# Patient Record
Sex: MALE | URBAN - METROPOLITAN AREA
[De-identification: names, ages, dates, MRNs, and addresses within clinical notes are randomized per-mention and may not be internally consistent; named-entity substitution may affect disease eponyms.]

---

## 2017-06-05 ENCOUNTER — APPOINTMENT (OUTPATIENT)
Age: 42
Setting detail: DERMATOLOGY
End: 2017-06-08

## 2017-06-05 DIAGNOSIS — D22 MELANOCYTIC NEVI: ICD-10-CM

## 2017-06-05 DIAGNOSIS — L57.8 OTHER SKIN CHANGES DUE TO CHRONIC EXPOSURE TO NONIONIZING RADIATION: ICD-10-CM

## 2017-06-05 DIAGNOSIS — Z85.828 PERSONAL HISTORY OF OTHER MALIGNANT NEOPLASM OF SKIN: ICD-10-CM

## 2017-06-05 PROBLEM — D22.61 MELANOCYTIC NEVI OF RIGHT UPPER LIMB, INCLUDING SHOULDER: Status: ACTIVE | Noted: 2017-06-05

## 2017-06-05 PROBLEM — C44.519 BASAL CELL CARCINOMA OF SKIN OF OTHER PART OF TRUNK: Status: ACTIVE | Noted: 2017-06-05

## 2017-06-05 PROBLEM — L70.0 ACNE VULGARIS: Status: ACTIVE | Noted: 2017-06-05

## 2017-06-05 PROBLEM — D22.39 MELANOCYTIC NEVI OF OTHER PARTS OF FACE: Status: ACTIVE | Noted: 2017-06-05

## 2017-06-05 PROBLEM — C44.612 BASAL CELL CARCINOMA OF SKIN OF RIGHT UPPER LIMB, INCLUDING SHOULDER: Status: ACTIVE | Noted: 2017-06-05

## 2017-06-05 PROBLEM — D22.62 MELANOCYTIC NEVI OF LEFT UPPER LIMB, INCLUDING SHOULDER: Status: ACTIVE | Noted: 2017-06-05

## 2017-06-05 PROBLEM — D22.5 MELANOCYTIC NEVI OF TRUNK: Status: ACTIVE | Noted: 2017-06-05

## 2017-06-05 PROCEDURE — OTHER MIPS QUALITY: OTHER

## 2017-06-05 PROCEDURE — OTHER OTHER: OTHER

## 2017-06-05 PROCEDURE — OTHER REASSURANCE: OTHER

## 2017-06-05 PROCEDURE — OTHER SHAVE REMOVAL AND DESTRUCTION: OTHER

## 2017-06-05 PROCEDURE — 99214 OFFICE O/P EST MOD 30 MIN: CPT | Mod: 25

## 2017-06-05 PROCEDURE — 17261 DSTRJ MAL LES T/A/L .6-1.0CM: CPT

## 2017-06-05 PROCEDURE — OTHER TREATMENT REGIMEN: OTHER

## 2017-06-05 PROCEDURE — 17261 DSTRJ MAL LES T/A/L .6-1.0CM: CPT | Mod: 59

## 2017-06-05 PROCEDURE — OTHER FOLLOW UP FOR NEXT VISIT: OTHER

## 2017-06-05 PROCEDURE — OTHER COUNSELING: OTHER

## 2017-06-05 ASSESSMENT — LOCATION DETAILED DESCRIPTION DERM
LOCATION DETAILED: LEFT PROXIMAL DORSAL FOREARM
LOCATION DETAILED: LEFT MEDIAL INFERIOR CHEST
LOCATION DETAILED: RIGHT PROXIMAL DORSAL FOREARM
LOCATION DETAILED: RIGHT INFERIOR MEDIAL UPPER BACK
LOCATION DETAILED: LEFT CENTRAL MALAR CHEEK
LOCATION DETAILED: LEFT SUPERIOR MEDIAL UPPER BACK

## 2017-06-05 ASSESSMENT — LOCATION SIMPLE DESCRIPTION DERM
LOCATION SIMPLE: LEFT UPPER BACK
LOCATION SIMPLE: CHEST
LOCATION SIMPLE: RIGHT UPPER BACK
LOCATION SIMPLE: LEFT FOREARM
LOCATION SIMPLE: LEFT CHEEK
LOCATION SIMPLE: RIGHT FOREARM

## 2017-06-05 ASSESSMENT — LOCATION ZONE DERM
LOCATION ZONE: TRUNK
LOCATION ZONE: ARM
LOCATION ZONE: FACE

## 2017-06-05 NOTE — PROCEDURE: MIPS QUALITY
Quality 47: Advance Care Plan: Advance Care Planning discussed and documented in the medical record; patient did not wish or was not able to name a surrogate decision maker or provide an advance care plan.
Quality 110: Preventive Care And Screening: Influenza Immunization: Influenza Immunization Ordered or Recommended, but not Administered due to system reason
Quality 111:Pneumonia Vaccination Status For Older Adults: Pneumococcal Vaccination not Administered or Previously Received, Reason not Otherwise Specified
Detail Level: Detailed

## 2017-06-05 NOTE — PROCEDURE: TREATMENT REGIMEN
Plan: This scar is well healed however it is large due to what appears to have been wound dehiscence at some point post operatively. However there is no evidence of any recurrence of basal cell carcinoma
Detail Level: Detailed

## 2017-06-05 NOTE — PROCEDURE: SHAVE REMOVAL AND DESTRUCTION
Render Post-Care Instructions In Note?: yes
Size After Destruction (Required For Destruction Billing): 0.7
Cautery Type: electrodesiccation
Size After Destruction (Required For Destruction Billing): 0.6
Billing Type: Third-Party Bill
Hemostasis: Electrocautery
Consent: Written consent was obtained and risks were reviewed including but not limited to scarring, infection, bleeding, scabbing, incomplete removal, nerve damage and allergy to anesthesia.
Wound Care: Polysporin ointment
Number Of Curettages: 2
Path Notes (To The Dermatopathologist): Likely basal cell carcinoma, please evaluate
Detail Level: Detailed
Notification Instructions: Patient will be notified of biopsy results. However, patient instructed to call the office if not contacted within 1 week.
Bill For Surgical Tray: no
Path Notes (To The Dermatopathologist): Possible pigmented basal cell carcinoma, please evaluate
Anesthesia Type: 1% Xylocaine with epinephrine
Bill As?: Note: Bill Malignant Destruction If Path Confirms Malignant Lesion. Only Bill As Shave Removal If Path Comes Back Benign. Do Not Bill Shave Removal On Malignant Lesions.: Malignant Destruction
Post-Care Instructions: I reviewed with the patient in detail post-care instructions. Patient is to keep the biopsy site dry overnight, and then apply bacitracin twice daily until healed. Patient may apply hydrogen peroxide soaks to remove any crusting.
Dressing: dry sterile dressing

## 2017-06-05 NOTE — PROCEDURE: OTHER
Other (Free Text): Normal upper body exam today, follow up in six months for full body skin exam
Note Text (......Xxx Chief Complaint.): This diagnosis correlates with the
Detail Level: Zone

## 2017-06-05 NOTE — PROCEDURE: REASSURANCE
Additional Note: Normal upper body exam, no suspicious lesions, no abnormal moles. Follow up annually for full body skin exam
Include Location In Plan?: Yes
Additional Note: Patient reassured that the brown lesion near his previous surgical site on the upper back is benign, there are no suspicious features
Detail Level: Zone
Detail Level: Detailed

## 2017-07-10 ENCOUNTER — APPOINTMENT (OUTPATIENT)
Age: 42
Setting detail: DERMATOLOGY
End: 2017-07-12

## 2017-07-10 DIAGNOSIS — L73.2 HIDRADENITIS SUPPURATIVA: ICD-10-CM

## 2017-07-10 DIAGNOSIS — L57.8 OTHER SKIN CHANGES DUE TO CHRONIC EXPOSURE TO NONIONIZING RADIATION: ICD-10-CM

## 2017-07-10 DIAGNOSIS — Z85.828 PERSONAL HISTORY OF OTHER MALIGNANT NEOPLASM OF SKIN: ICD-10-CM

## 2017-07-10 PROCEDURE — OTHER PRESCRIPTION: OTHER

## 2017-07-10 PROCEDURE — OTHER COUNSELING: OTHER

## 2017-07-10 PROCEDURE — OTHER TREATMENT REGIMEN: OTHER

## 2017-07-10 PROCEDURE — 99214 OFFICE O/P EST MOD 30 MIN: CPT

## 2017-07-10 RX ORDER — DOXYCYCLINE HYCLATE 100 MG/1
100 MG CAPSULE, GELATIN COATED ORAL BID
Qty: 60 | Refills: 0 | Status: ERX | COMMUNITY
Start: 2017-07-10

## 2017-07-10 ASSESSMENT — LOCATION DETAILED DESCRIPTION DERM
LOCATION DETAILED: RIGHT POSTERIOR SHOULDER
LOCATION DETAILED: LEFT LATERAL SUPERIOR CHEST
LOCATION DETAILED: RIGHT AXILLARY VAULT

## 2017-07-10 ASSESSMENT — LOCATION SIMPLE DESCRIPTION DERM
LOCATION SIMPLE: CHEST
LOCATION SIMPLE: RIGHT SHOULDER
LOCATION SIMPLE: RIGHT AXILLARY VAULT

## 2017-07-10 ASSESSMENT — LOCATION ZONE DERM
LOCATION ZONE: TRUNK
LOCATION ZONE: AXILLAE
LOCATION ZONE: ARM

## 2017-07-10 NOTE — PROCEDURE: TREATMENT REGIMEN
Plan: Both of the treatment sites appear to be healed. There is no evidence of any residual disease. No further treatment needed at this time. Continue to monitor for any recurrence, patient will call if any changes. Follow up in six months
Detail Level: Detailed
Plan: Long discussion with patient regarding the various treatment options. He is not interested in incision and drainage at this time, the area has been draining over the last 3 to 5 days and is slowly improving. We also discussed the possibility of Humira injections however he only has rare and occasional flareups. He is interested in discussing the possibility of surgical treatment option so we will set him up with an appointment with Dr. Hong if this is something he would be able to treat

## 2017-07-10 NOTE — HPI: CYST
How Severe Is Your Cyst?: severe
Is This A New Presentation, Or A Follow-Up?: Cyst
Additional History: Patient has a history of recurrent cysts in the axilla , hips, and occasionally in the gluteal cleft. He has dealt with this problem for numerous years, has a history of HS. Overall he only gets rare and occasional flareups. He has taken antibiotics in the past, he has had surgery in the past for incision and drainage but is interested in the various other treatment options that might be available.

## 2018-02-02 ENCOUNTER — APPOINTMENT (OUTPATIENT)
Age: 43
Setting detail: DERMATOLOGY
End: 2018-02-02

## 2018-02-02 DIAGNOSIS — L65.9 NONSCARRING HAIR LOSS, UNSPECIFIED: ICD-10-CM

## 2018-02-02 DIAGNOSIS — L57.8 OTHER SKIN CHANGES DUE TO CHRONIC EXPOSURE TO NONIONIZING RADIATION: ICD-10-CM

## 2018-02-02 DIAGNOSIS — L72.8 OTHER FOLLICULAR CYSTS OF THE SKIN AND SUBCUTANEOUS TISSUE: ICD-10-CM

## 2018-02-02 DIAGNOSIS — Z85.828 PERSONAL HISTORY OF OTHER MALIGNANT NEOPLASM OF SKIN: ICD-10-CM

## 2018-02-02 PROCEDURE — OTHER SUNSCREEN RECOMMENDATIONS: OTHER

## 2018-02-02 PROCEDURE — 99214 OFFICE O/P EST MOD 30 MIN: CPT | Mod: 25

## 2018-02-02 PROCEDURE — OTHER REASSURANCE: OTHER

## 2018-02-02 PROCEDURE — OTHER TREATMENT REGIMEN: OTHER

## 2018-02-02 PROCEDURE — OTHER INCISION AND DRAINAGE: OTHER

## 2018-02-02 PROCEDURE — OTHER COUNSELING: OTHER

## 2018-02-02 PROCEDURE — 10060 I&D ABSCESS SIMPLE/SINGLE: CPT

## 2018-02-02 PROCEDURE — OTHER PRESCRIPTION: OTHER

## 2018-02-02 RX ORDER — FINASTERIDE 1 MG/1
1 MG TABLET, FILM COATED ORAL DAILY
Qty: 90 | Refills: 4 | Status: ERX | COMMUNITY
Start: 2018-02-02

## 2018-02-02 ASSESSMENT — LOCATION DETAILED DESCRIPTION DERM
LOCATION DETAILED: RIGHT MID-UPPER BACK
LOCATION DETAILED: LEFT LATERAL SUPERIOR CHEST
LOCATION DETAILED: RIGHT POSTERIOR SHOULDER
LOCATION DETAILED: LEFT MEDIAL FOREHEAD
LOCATION DETAILED: LEFT INFERIOR LATERAL UPPER BACK
LOCATION DETAILED: LEFT MEDIAL INFERIOR CHEST
LOCATION DETAILED: SUPERIOR THORACIC SPINE

## 2018-02-02 ASSESSMENT — LOCATION SIMPLE DESCRIPTION DERM
LOCATION SIMPLE: CHEST
LOCATION SIMPLE: UPPER BACK
LOCATION SIMPLE: RIGHT UPPER BACK
LOCATION SIMPLE: LEFT UPPER BACK
LOCATION SIMPLE: RIGHT SHOULDER
LOCATION SIMPLE: LEFT FOREHEAD

## 2018-02-02 ASSESSMENT — LOCATION ZONE DERM
LOCATION ZONE: ARM
LOCATION ZONE: TRUNK
LOCATION ZONE: FACE

## 2018-02-02 NOTE — PROCEDURE: TREATMENT REGIMEN
Plan: There is a large scar on the mid upper back from previous MOHs surgery but there is no evidence of any recurrence. Apparently patient had a bad infection after surgery which led to wound dehiscence and a very large scar
Plan: Both of the treatment sites appear to be healed. There is no evidence of any residual disease. No further treatment needed at this time. Continue to monitor for any recurrence, patient will call if any changes. Follow up in six months
Detail Level: Simple
Detail Level: Detailed
Plan: Patient requested refill on finasteride. No side effects on the medication. Refilled for up to one year
Plan: Initially I was unsure if this represented a sebaceous cyst but after incision and drainage a large amount of keratinaceous product was expressed. I was also able to express the entire capsule of the cyst. It should heal without any recurrence. Patient will call or follow up if any problems

## 2018-02-02 NOTE — PROCEDURE: REASSURANCE
Detail Level: Zone
Include Location In Plan?: Yes
Additional Note: No evidence of any pre-cancers or skin cancers. Follow up every six months

## 2018-02-02 NOTE — PROCEDURE: INCISION AND DRAINAGE
Include Sutures?: No
Epidermal Sutures: 4-0 Ethilon
Post-Care Instructions: I reviewed with the patient in detail post-care instructions. Patient should keep wound covered and call the office should any redness, pain, swelling or worsening occur.
Lesion Type: Cyst
Detail Level: Detailed
Render Postcare In Note?: Yes
Drainage Amount?: significant
Suture Text: The incision was partially closed with
Epidermal Closure: simple interrupted
Size Of Lesion In Cm (Optional But May Be Required For Some Insurances): 1
Drainage Type?: keratinaceous
Preparation Text: The area was prepped in the usual clean fashion.
Dressing: dry sterile dressing
Curette Text (Optional): After the contents were expressed a curette was used to partially remove the cyst wall.
Anesthesia Type: 1% Xylocaine with epinephrine
Method: 11 blade
Consent was obtained and risks were reviewed including but not limited to delayed wound healing, infection, need for multiple I and D's, and pain.
Wound Care: Polysporin ointment
Anesthesia Volume In Cc: 0.5

## 2022-02-05 ENCOUNTER — APPOINTMENT (OUTPATIENT)
Dept: CT IMAGING | Age: 47
DRG: 897 | End: 2022-02-05
Payer: MEDICAID

## 2022-02-05 ENCOUNTER — HOSPITAL ENCOUNTER (INPATIENT)
Age: 47
LOS: 2 days | Discharge: HOME | DRG: 897 | End: 2022-02-07
Attending: STUDENT IN AN ORGANIZED HEALTH CARE EDUCATION/TRAINING PROGRAM | Admitting: STUDENT IN AN ORGANIZED HEALTH CARE EDUCATION/TRAINING PROGRAM
Payer: MEDICAID

## 2022-02-05 DIAGNOSIS — F10.939 ALCOHOL WITHDRAWAL SYNDROME WITH COMPLICATION (HCC): ICD-10-CM

## 2022-02-05 DIAGNOSIS — R56.9 SEIZURE (HCC): Primary | ICD-10-CM

## 2022-02-05 PROBLEM — F41.1 GAD (GENERALIZED ANXIETY DISORDER): Status: ACTIVE | Noted: 2022-02-05

## 2022-02-05 PROBLEM — F10.20 ALCOHOLISM (HCC): Status: ACTIVE | Noted: 2022-02-05

## 2022-02-05 LAB
ALBUMIN SERPL-MCNC: 4.4 G/DL (ref 3.5–5.2)
ALP BLD-CCNC: 118 U/L (ref 40–130)
ALT SERPL-CCNC: 55 U/L (ref 5–41)
AMPHETAMINE SCREEN, URINE: NEGATIVE
ANION GAP SERPL CALCULATED.3IONS-SCNC: 21 MMOL/L (ref 7–19)
AST SERPL-CCNC: 70 U/L (ref 5–40)
BACTERIA: NEGATIVE /HPF
BARBITURATE SCREEN URINE: NEGATIVE
BASOPHILS ABSOLUTE: 0.1 K/UL (ref 0–0.2)
BASOPHILS RELATIVE PERCENT: 0.7 % (ref 0–1)
BENZODIAZEPINE SCREEN, URINE: NEGATIVE
BILIRUB SERPL-MCNC: 0.5 MG/DL (ref 0.2–1.2)
BILIRUBIN URINE: NEGATIVE
BLOOD, URINE: NEGATIVE
BUN BLDV-MCNC: 9 MG/DL (ref 6–20)
CALCIUM SERPL-MCNC: 9.7 MG/DL (ref 8.6–10)
CANNABINOID SCREEN URINE: NEGATIVE
CHLORIDE BLD-SCNC: 93 MMOL/L (ref 98–111)
CLARITY: CLEAR
CO2: 19 MMOL/L (ref 22–29)
COCAINE METABOLITE SCREEN URINE: NEGATIVE
COLOR: YELLOW
CREAT SERPL-MCNC: 1 MG/DL (ref 0.5–1.2)
CRYSTALS, UA: ABNORMAL /HPF
EOSINOPHILS ABSOLUTE: 0.1 K/UL (ref 0–0.6)
EOSINOPHILS RELATIVE PERCENT: 1.1 % (ref 0–5)
EPITHELIAL CELLS, UA: 0 /HPF (ref 0–5)
ETHANOL: <10 MG/DL (ref 0–0.08)
GFR AFRICAN AMERICAN: >59
GFR NON-AFRICAN AMERICAN: >60
GLUCOSE BLD-MCNC: 108 MG/DL (ref 74–109)
GLUCOSE URINE: NEGATIVE MG/DL
HCT VFR BLD CALC: 43.7 % (ref 42–52)
HEMOGLOBIN: 14.5 G/DL (ref 14–18)
HYALINE CASTS: 7 /HPF (ref 0–8)
IMMATURE GRANULOCYTES #: 0.1 K/UL
KETONES, URINE: 15 MG/DL
LEUKOCYTE ESTERASE, URINE: NEGATIVE
LIPASE: 27 U/L (ref 13–60)
LYMPHOCYTES ABSOLUTE: 0.7 K/UL (ref 1.1–4.5)
LYMPHOCYTES RELATIVE PERCENT: 9.7 % (ref 20–40)
Lab: NORMAL
MCH RBC QN AUTO: 33.1 PG (ref 27–31)
MCHC RBC AUTO-ENTMCNC: 33.2 G/DL (ref 33–37)
MCV RBC AUTO: 99.8 FL (ref 80–94)
MONOCYTES ABSOLUTE: 0.8 K/UL (ref 0–0.9)
MONOCYTES RELATIVE PERCENT: 10.2 % (ref 0–10)
NEUTROPHILS ABSOLUTE: 5.9 K/UL (ref 1.5–7.5)
NEUTROPHILS RELATIVE PERCENT: 77.4 % (ref 50–65)
NITRITE, URINE: NEGATIVE
OPIATE SCREEN URINE: NEGATIVE
PDW BLD-RTO: 15 % (ref 11.5–14.5)
PH UA: 6 (ref 5–8)
PLATELET # BLD: 156 K/UL (ref 130–400)
PMV BLD AUTO: 10.1 FL (ref 9.4–12.4)
POTASSIUM REFLEX MAGNESIUM: 4.2 MMOL/L (ref 3.5–5)
PROTEIN UA: 30 MG/DL
RBC # BLD: 4.38 M/UL (ref 4.7–6.1)
RBC UA: 1 /HPF (ref 0–4)
SARS-COV-2, NAAT: NOT DETECTED
SODIUM BLD-SCNC: 133 MMOL/L (ref 136–145)
SPECIFIC GRAVITY UA: 1.01 (ref 1–1.03)
TOTAL PROTEIN: 7.5 G/DL (ref 6.6–8.7)
UROBILINOGEN, URINE: 1 E.U./DL
WBC # BLD: 7.6 K/UL (ref 4.8–10.8)
WBC UA: 1 /HPF (ref 0–5)

## 2022-02-05 PROCEDURE — 99284 EMERGENCY DEPT VISIT MOD MDM: CPT

## 2022-02-05 PROCEDURE — 87635 SARS-COV-2 COVID-19 AMP PRB: CPT

## 2022-02-05 PROCEDURE — 6370000000 HC RX 637 (ALT 250 FOR IP): Performed by: NURSE PRACTITIONER

## 2022-02-05 PROCEDURE — 93005 ELECTROCARDIOGRAM TRACING: CPT | Performed by: NURSE PRACTITIONER

## 2022-02-05 PROCEDURE — 82077 ASSAY SPEC XCP UR&BREATH IA: CPT

## 2022-02-05 PROCEDURE — 70450 CT HEAD/BRAIN W/O DYE: CPT

## 2022-02-05 PROCEDURE — 36415 COLL VENOUS BLD VENIPUNCTURE: CPT

## 2022-02-05 PROCEDURE — 96375 TX/PRO/DX INJ NEW DRUG ADDON: CPT

## 2022-02-05 PROCEDURE — 83690 ASSAY OF LIPASE: CPT

## 2022-02-05 PROCEDURE — 6360000002 HC RX W HCPCS: Performed by: NURSE PRACTITIONER

## 2022-02-05 PROCEDURE — 80307 DRUG TEST PRSMV CHEM ANLYZR: CPT

## 2022-02-05 PROCEDURE — HZ2ZZZZ DETOXIFICATION SERVICES FOR SUBSTANCE ABUSE TREATMENT: ICD-10-PCS | Performed by: STUDENT IN AN ORGANIZED HEALTH CARE EDUCATION/TRAINING PROGRAM

## 2022-02-05 PROCEDURE — 1210000000 HC MED SURG R&B

## 2022-02-05 PROCEDURE — 81001 URINALYSIS AUTO W/SCOPE: CPT

## 2022-02-05 PROCEDURE — 80053 COMPREHEN METABOLIC PANEL: CPT

## 2022-02-05 PROCEDURE — 2580000003 HC RX 258: Performed by: NURSE PRACTITIONER

## 2022-02-05 PROCEDURE — 85025 COMPLETE CBC W/AUTO DIFF WBC: CPT

## 2022-02-05 PROCEDURE — 96374 THER/PROPH/DIAG INJ IV PUSH: CPT

## 2022-02-05 RX ORDER — THIAMINE HYDROCHLORIDE 100 MG/ML
100 INJECTION, SOLUTION INTRAMUSCULAR; INTRAVENOUS ONCE
Status: COMPLETED | OUTPATIENT
Start: 2022-02-05 | End: 2022-02-05

## 2022-02-05 RX ORDER — MULTIVITAMIN WITH IRON
1 TABLET ORAL ONCE
Status: COMPLETED | OUTPATIENT
Start: 2022-02-05 | End: 2022-02-05

## 2022-02-05 RX ORDER — POTASSIUM CHLORIDE 7.45 MG/ML
10 INJECTION INTRAVENOUS PRN
Status: DISCONTINUED | OUTPATIENT
Start: 2022-02-05 | End: 2022-02-07 | Stop reason: HOSPADM

## 2022-02-05 RX ORDER — ACETAMINOPHEN 325 MG/1
650 TABLET ORAL EVERY 6 HOURS PRN
Status: DISCONTINUED | OUTPATIENT
Start: 2022-02-05 | End: 2022-02-07 | Stop reason: HOSPADM

## 2022-02-05 RX ORDER — LORAZEPAM 1 MG/1
3 TABLET ORAL
Status: DISCONTINUED | OUTPATIENT
Start: 2022-02-05 | End: 2022-02-07 | Stop reason: HOSPADM

## 2022-02-05 RX ORDER — POTASSIUM CHLORIDE 20 MEQ/1
40 TABLET, EXTENDED RELEASE ORAL PRN
Status: DISCONTINUED | OUTPATIENT
Start: 2022-02-05 | End: 2022-02-07 | Stop reason: HOSPADM

## 2022-02-05 RX ORDER — LORAZEPAM 1 MG/1
1 TABLET ORAL
Status: DISCONTINUED | OUTPATIENT
Start: 2022-02-05 | End: 2022-02-07 | Stop reason: HOSPADM

## 2022-02-05 RX ORDER — LORAZEPAM 2 MG/ML
4 INJECTION INTRAMUSCULAR
Status: DISCONTINUED | OUTPATIENT
Start: 2022-02-05 | End: 2022-02-07 | Stop reason: HOSPADM

## 2022-02-05 RX ORDER — LORAZEPAM 2 MG/ML
1 INJECTION INTRAMUSCULAR ONCE
Status: COMPLETED | OUTPATIENT
Start: 2022-02-05 | End: 2022-02-05

## 2022-02-05 RX ORDER — MAGNESIUM SULFATE IN WATER 40 MG/ML
2000 INJECTION, SOLUTION INTRAVENOUS PRN
Status: DISCONTINUED | OUTPATIENT
Start: 2022-02-05 | End: 2022-02-07 | Stop reason: HOSPADM

## 2022-02-05 RX ORDER — SODIUM CHLORIDE 0.9 % (FLUSH) 0.9 %
5-40 SYRINGE (ML) INJECTION PRN
Status: DISCONTINUED | OUTPATIENT
Start: 2022-02-05 | End: 2022-02-07 | Stop reason: HOSPADM

## 2022-02-05 RX ORDER — LORAZEPAM 1 MG/1
4 TABLET ORAL
Status: DISCONTINUED | OUTPATIENT
Start: 2022-02-05 | End: 2022-02-07 | Stop reason: HOSPADM

## 2022-02-05 RX ORDER — PAROXETINE 10 MG/1
10 TABLET, FILM COATED ORAL DAILY
Status: DISCONTINUED | OUTPATIENT
Start: 2022-02-06 | End: 2022-02-07 | Stop reason: HOSPADM

## 2022-02-05 RX ORDER — THIAMINE HYDROCHLORIDE 100 MG/ML
100 INJECTION, SOLUTION INTRAMUSCULAR; INTRAVENOUS DAILY
Status: DISCONTINUED | OUTPATIENT
Start: 2022-02-06 | End: 2022-02-07 | Stop reason: HOSPADM

## 2022-02-05 RX ORDER — POLYETHYLENE GLYCOL 3350 17 G/17G
17 POWDER, FOR SOLUTION ORAL DAILY PRN
Status: DISCONTINUED | OUTPATIENT
Start: 2022-02-05 | End: 2022-02-07 | Stop reason: HOSPADM

## 2022-02-05 RX ORDER — LORAZEPAM 2 MG/ML
1 INJECTION INTRAMUSCULAR
Status: DISCONTINUED | OUTPATIENT
Start: 2022-02-05 | End: 2022-02-07 | Stop reason: HOSPADM

## 2022-02-05 RX ORDER — ONDANSETRON 4 MG/1
4 TABLET, ORALLY DISINTEGRATING ORAL EVERY 8 HOURS PRN
Status: DISCONTINUED | OUTPATIENT
Start: 2022-02-05 | End: 2022-02-07 | Stop reason: HOSPADM

## 2022-02-05 RX ORDER — POTASSIUM CHLORIDE 1.5 G/1.77G
40 POWDER, FOR SOLUTION ORAL PRN
Status: DISCONTINUED | OUTPATIENT
Start: 2022-02-05 | End: 2022-02-07 | Stop reason: HOSPADM

## 2022-02-05 RX ORDER — LORAZEPAM 1 MG/1
2 TABLET ORAL
Status: DISCONTINUED | OUTPATIENT
Start: 2022-02-05 | End: 2022-02-07 | Stop reason: HOSPADM

## 2022-02-05 RX ORDER — LORAZEPAM 2 MG/ML
2 INJECTION INTRAMUSCULAR
Status: DISCONTINUED | OUTPATIENT
Start: 2022-02-05 | End: 2022-02-07 | Stop reason: HOSPADM

## 2022-02-05 RX ORDER — PAROXETINE HYDROCHLORIDE 12.5 MG/1
12.5 TABLET, FILM COATED, EXTENDED RELEASE ORAL EVERY MORNING
COMMUNITY
End: 2022-10-27

## 2022-02-05 RX ORDER — SODIUM CHLORIDE 0.9 % (FLUSH) 0.9 %
5-40 SYRINGE (ML) INJECTION EVERY 12 HOURS SCHEDULED
Status: DISCONTINUED | OUTPATIENT
Start: 2022-02-05 | End: 2022-02-07 | Stop reason: HOSPADM

## 2022-02-05 RX ORDER — OXYMETAZOLINE HYDROCHLORIDE 0.05 G/100ML
2 SPRAY NASAL 2 TIMES DAILY
COMMUNITY
End: 2022-10-27

## 2022-02-05 RX ORDER — FOLIC ACID 1 MG/1
1 TABLET ORAL DAILY
Status: DISCONTINUED | OUTPATIENT
Start: 2022-02-06 | End: 2022-02-07 | Stop reason: HOSPADM

## 2022-02-05 RX ORDER — LORAZEPAM 2 MG/ML
3 INJECTION INTRAMUSCULAR
Status: DISCONTINUED | OUTPATIENT
Start: 2022-02-05 | End: 2022-02-07 | Stop reason: HOSPADM

## 2022-02-05 RX ORDER — MULTIVITAMIN WITH IRON
1 TABLET ORAL DAILY
Status: DISCONTINUED | OUTPATIENT
Start: 2022-02-06 | End: 2022-02-07 | Stop reason: HOSPADM

## 2022-02-05 RX ORDER — M-VIT,TX,IRON,MINS/CALC/FOLIC 27MG-0.4MG
1 TABLET ORAL DAILY
COMMUNITY
End: 2022-10-27

## 2022-02-05 RX ORDER — ACETAMINOPHEN 650 MG/1
650 SUPPOSITORY RECTAL EVERY 6 HOURS PRN
Status: DISCONTINUED | OUTPATIENT
Start: 2022-02-05 | End: 2022-02-07 | Stop reason: HOSPADM

## 2022-02-05 RX ORDER — FOLIC ACID 1 MG/1
1 TABLET ORAL ONCE
Status: COMPLETED | OUTPATIENT
Start: 2022-02-05 | End: 2022-02-05

## 2022-02-05 RX ORDER — SODIUM CHLORIDE 9 MG/ML
25 INJECTION, SOLUTION INTRAVENOUS PRN
Status: DISCONTINUED | OUTPATIENT
Start: 2022-02-05 | End: 2022-02-07 | Stop reason: HOSPADM

## 2022-02-05 RX ORDER — SODIUM CHLORIDE, SODIUM LACTATE, POTASSIUM CHLORIDE, CALCIUM CHLORIDE 600; 310; 30; 20 MG/100ML; MG/100ML; MG/100ML; MG/100ML
INJECTION, SOLUTION INTRAVENOUS CONTINUOUS
Status: ACTIVE | OUTPATIENT
Start: 2022-02-05 | End: 2022-02-06

## 2022-02-05 RX ORDER — ONDANSETRON 2 MG/ML
4 INJECTION INTRAMUSCULAR; INTRAVENOUS EVERY 6 HOURS PRN
Status: DISCONTINUED | OUTPATIENT
Start: 2022-02-05 | End: 2022-02-07 | Stop reason: HOSPADM

## 2022-02-05 RX ADMIN — SODIUM CHLORIDE, POTASSIUM CHLORIDE, SODIUM LACTATE AND CALCIUM CHLORIDE: 600; 310; 30; 20 INJECTION, SOLUTION INTRAVENOUS at 18:19

## 2022-02-05 RX ADMIN — ENOXAPARIN SODIUM 40 MG: 100 INJECTION SUBCUTANEOUS at 18:18

## 2022-02-05 RX ADMIN — LORAZEPAM 4 MG: 2 INJECTION INTRAMUSCULAR; INTRAVENOUS at 22:00

## 2022-02-05 RX ADMIN — LORAZEPAM 1 MG: 2 INJECTION INTRAMUSCULAR; INTRAVENOUS at 15:25

## 2022-02-05 RX ADMIN — THERA TABS 1 TABLET: TAB at 15:42

## 2022-02-05 RX ADMIN — LORAZEPAM 2 MG: 2 INJECTION INTRAMUSCULAR; INTRAVENOUS at 18:11

## 2022-02-05 RX ADMIN — LORAZEPAM 2 MG: 2 INJECTION INTRAMUSCULAR; INTRAVENOUS at 20:40

## 2022-02-05 RX ADMIN — THIAMINE HYDROCHLORIDE 100 MG: 100 INJECTION, SOLUTION INTRAMUSCULAR; INTRAVENOUS at 15:42

## 2022-02-05 RX ADMIN — LORAZEPAM 4 MG: 1 TABLET ORAL at 23:17

## 2022-02-05 RX ADMIN — SODIUM CHLORIDE, PRESERVATIVE FREE 10 ML: 5 INJECTION INTRAVENOUS at 20:40

## 2022-02-05 RX ADMIN — ONDANSETRON 4 MG: 2 INJECTION INTRAMUSCULAR; INTRAVENOUS at 20:40

## 2022-02-05 RX ADMIN — FOLIC ACID 1 MG: 1 TABLET ORAL at 15:42

## 2022-02-05 ASSESSMENT — PAIN SCALES - GENERAL: PAINLEVEL_OUTOF10: 1

## 2022-02-05 ASSESSMENT — ENCOUNTER SYMPTOMS
NAUSEA: 0
COUGH: 1
VOMITING: 0
SHORTNESS OF BREATH: 0
ABDOMINAL PAIN: 0
BACK PAIN: 0
DIARRHEA: 0
COUGH: 0

## 2022-02-05 NOTE — ED PROVIDER NOTES
[facility] EMERGENCY DEPT  EMERGENCY DEPARTMENT ENCOUNTER      Pt Name: Bjorn Pathak  MRN: [id number]  [place] 1975  Date of evaluation: 2/5/2022  Provider: TIBURCIO Romero [id number]       Chief Complaint   Patient presents with    Seizures     seizure activiity today, attempting to stop drinking         HISTORY OF PRESENT ILLNESS   (Location/Symptom, Timing/Onset, Context/Setting, Quality, Duration, Modifying Factors, Severity)  Note limiting factors. Bjorn Pathak is a 51 y.o. male who presents to the emergency department with concern for alcohol withdrawal and seizure today. He tells me he has had one alcohol withdrawal related seizure previously, several years ago. This week he told his girlfriend he would quit drinking, decided he needed to do this in a stepwise fashion so he didn't have any seizures, so on Wednesday he quit drinking his daily pint of vodka but continued to drink beer as normal. Last etoh was last night around midnight. He tells me today he was feeling jittery, shaky and \"not right\". He laid himself down on the ground and his girlfriend witnessed a seizure. She describes full body shaking, she rolled him on his side. He peed on himself during the episode and describes feeling groggy afterward. Now, he feels back to himself, but still feeling jittery, anxious and worried about having seizures. HPI    Nursing Notes were reviewed. REVIEW OF SYSTEMS    (2-9 systems for level 4, 10 or more for level 5)     Review of Systems   Constitutional: Negative for chills and fever. HENT: Negative for congestion. Respiratory: Negative for cough and shortness of breath. Cardiovascular: Negative for chest pain and palpitations. Gastrointestinal: Negative for abdominal pain, diarrhea, nausea and vomiting. Genitourinary: Negative for dysuria, flank pain, frequency and urgency. Musculoskeletal: Negative for back pain, myalgias and neck pain.    Neurological: Positive for tremors and seizures. Negative for dizziness, syncope, facial asymmetry, speech difficulty, weakness, light-headedness and numbness. Except as noted above the remainder of the review of systems was reviewed and negative. PAST MEDICAL HISTORY     Past Medical History:   Diagnosis Date    Seizures ([facility] [address]          SURGICAL HISTORY       Past Surgical History:   Procedure Laterality Date    EYE SURGERY      ORTHOPEDIC SURGERY           CURRENT MEDICATIONS       Previous Medications    PAROXETINE (PAXIL CR) 12.5 MG EXTENDED RELEASE TABLET    Take 12.5 mg by mouth every morning       ALLERGIES     Bactrim [sulfamethoxazole-trimethoprim]    FAMILY HISTORY     No family history on file. SOCIAL HISTORY       Social History     Socioeconomic History    Marital status: Single     Spouse name: Not on file    Number of children: Not on file    Years of education: Not on file    Highest education level: Not on file   Occupational History    Not on file   Tobacco Use    Smoking status: Current Every Day Smoker    Smokeless tobacco: Never Used   Vaping Use    Vaping Use: Never used   Substance and Sexual Activity    Alcohol use: Yes    Drug use: Never    Sexual activity: Not on file   Other Topics Concern    Not on file   Social History Narrative    Not on file     Social Determinants of Health     Financial Resource Strain:     Difficulty of Paying Living Expenses: Not on file   Food Insecurity:     Worried About Running Out of Food in the Last Year: Not on file    Chandu of Food in the Last Year: Not on file   Transportation Needs:     Lack of Transportation (Medical): Not on file    Lack of Transportation (Non-Medical):  Not on file   Physical Activity:     Days of Exercise per Week: Not on file    Minutes of Exercise per Session: Not on file   Stress:     Feeling of Stress : Not on file   Social Connections:     Frequency of Communication with Friends and Family: Not on file    Frequency of Social Gatherings with Friends and Family: Not on file    Attends Bahai Services: Not on file    Active Member of Clubs or Organizations: Not on file    Attends Club or Organization Meetings: Not on file    Marital Status: Not on file   Intimate Partner Violence:     Fear of Current or Ex-Partner: Not on file    Emotionally Abused: Not on file    Physically Abused: Not on file    Sexually Abused: Not on file   Housing Stability:     Unable to Pay for Housing in the Last Year: Not on file    Number of [place] in the Last Year: Not on file    Unstable Housing in the Last Year: Not on file       SCREENINGS         Linda Coma Scale  Eye Opening: Spontaneous  Best Verbal Response: Oriented  Best Motor Response: Obeys commands  Linda Coma Scale Score: 15                     CIWA Assessment  BP: 123/71  Pulse: 112  Nausea and Vomiting: no nausea and no vomiting  Tactile Disturbances: none  Tremor: 5  Auditory Disturbances: not present  Paroxysmal Sweats: beads of sweat obvious on forehead  Visual Disturbances: not present  Anxiety: 3  Headache, Fullness in Head: none present  Agitation: normal activity  Orientation and Clouding of Sensorium: oriented and can do serial additions  CIWA-Ar Total: 12                 PHYSICAL EXAM    (up to 7 for level 4, 8 or more for level 5)     ED Triage Vitals [02/05/22 1502]   BP Temp Temp Source Pulse Resp SpO2 Height Weight   123/71 98.4 °F (36.9 °C) Oral 112 18 94 % 6' 2\" (1.88 m) 200 lb (90.7 kg)       Physical Exam  Vitals reviewed. Constitutional:       Appearance: Normal appearance. Comments: Diaphoretic, tremors   HENT:      Head: Normocephalic and atraumatic. Right Ear: Tympanic membrane, ear canal and external ear normal.      Left Ear: Tympanic membrane and ear canal normal.      Nose: Nose normal.      Mouth/Throat:      Mouth: Mucous membranes are moist.      Pharynx: Oropharynx is clear.    Eyes:      Extraocular MCH 33.1 (*)     RDW 15.0 (*)     Neutrophils % 77.4 (*)     Lymphocytes % 9.7 (*)     Monocytes % 10.2 (*)     Lymphocytes Absolute 0.7 (*)     All other components within normal limits   COMPREHENSIVE METABOLIC PANEL W/ REFLEX TO MG FOR LOW K - Abnormal; Notable for the following components:    Sodium 133 (*)     Chloride 93 (*)     CO2 19 (*)     Anion Gap 21 (*)     ALT 55 (*)     AST 70 (*)     All other components within normal limits   URINALYSIS - Abnormal; Notable for the following components:    Ketones, Urine 15 (*)     Protein, UA 30 (*)     All other components within normal limits   MICROSCOPIC URINALYSIS - Abnormal; Notable for the following components:    Bacteria, UA NEGATIVE (*)     Crystals, UA NEG (*)     All other components within normal limits   COVID-19, RAPID   LIPASE   URINE DRUG SCREEN   ETHANOL       All other labs were within normal range or not returned as of this dictation. EMERGENCY DEPARTMENT COURSE and DIFFERENTIAL DIAGNOSIS/MDM:   Vitals:    Vitals:    02/05/22 1502   BP: 123/71   Pulse: 112   Resp: 18   Temp: 98.4 °F (36.9 °C)   TempSrc: Oral   SpO2: 94%   Weight: 200 lb (90.7 kg)   Height: 6' 2\" (1.88 m)         MDM      REASSESSMENT      Appears to have had alcohol withdrawal seizure PTA with tremors and diaphoresis in department. Case d/w Adonay/Dr Blue who accept admission. CRITICAL CARE TIME       CONSULTS:  IP CONSULT TO SOCIAL WORK    PROCEDURES:  Unless otherwise noted below, none     Procedures         FINAL IMPRESSION      1. Seizure ([address]    2. Alcohol withdrawal syndrome with complication (HCC)          DISPOSITION/PLAN   DISPOSITION        PATIENT REFERRED TO:  No follow-up provider specified. DISCHARGE MEDICATIONS:  New Prescriptions    No medications on file     Controlled Substances Monitoring:     No flowsheet data found.     (Please note that portions of this note were completed with a voice recognition program.  Efforts were made to edit the dictations but occasionally words are mis-transcribed.)    TIBURCIO Arita CNP (electronically signed)  Attending Emergency Physician         TIBURCIO Arita CNP  02/05/22 1022

## 2022-02-05 NOTE — H&P
[address] - History & Physical      PCP: Ifeanyi Pastrana    Date of Admission: 2/5/2022    Date of Service: 2/5/2022     Chief Complaint:  Alcohol withdrawal     History Of Present Illness: The patient is a 51 y.o. male who presented to [facility] ED complaining of alcohol withdrawal. Pt related that he wants to stop drinking alcohol reporting daily intake of 1pint vodka and 4cans of beer. He stopped drinking vodka 4 days ago and last drank beer this morning around 2am.     He had ran some errands in [place] with his girlfriend and on returning home developed increase tremulousness, anxiety with subsequent seizure. He tells me that he was sitting in a chair in his living room and his girlfriend helped him to the ground. He shows me an abrasion to the right side of his tongue. He reported being incontinent of urine during episode. He has history of prior seizure activity associated with alcohol withdraw in past.     In ED, patient with reported CIWA score of 12. CT of head with noted mild cerebral and cerebellar volume loss. No acute intracranial abnormality. Wbc 8, hgb 14.5, platelets 967, sodium 133, potassium 4.2, CO2 19, anion gap 21, creatinine 1.0/BUN 9, blood sugar 108, etoh less than 10, AST 79, ALT 55, alk phos 118, UDS negative, covid negative, lipase 27. Pt was admitted to hospitalist service for further evaluation and treatment of alcohol withdrawal seizure. Past Medical History:        Diagnosis Date    Seizures [facility])        Past Surgical History:        Procedure Laterality Date    EYE SURGERY      ORTHOPEDIC SURGERY         Home Medications:  Prior to Admission medications    Medication Sig Start Date End Date Taking?  Authorizing Provider   PARoxetine (PAXIL CR) 12.5 MG extended release tablet Take 12.5 mg by mouth every morning   Yes Historical Provider, MD       Allergies:    Bactrim [sulfamethoxazole-trimethoprim]    Social History:    The patient currently lives alone  Tobacco:   reports that he has been smoking. He has never used smokeless tobacco.  Alcohol:   reports current alcohol use. Illicit Drugs: none    Family History:  No family history on file. Review of Systems:   Review of Systems   Constitutional: Positive for activity change and fatigue. Negative for fever. Respiratory: Positive for cough. Gastrointestinal: Negative for abdominal pain and vomiting. Neurological: Positive for weakness (generalized). All other systems reviewed and are negative. 14 point review of systems is negative except as specifically addressed above. Physical Examination:  /71   Pulse 112   Temp 98.4 °F (36.9 °C) (Oral)   Resp 18   Ht 6' 2\" (1.88 m)   Wt 200 lb (90.7 kg)   SpO2 94%   BMI 25.68 kg/m²   Physical Exam  Vitals reviewed. Constitutional:       Comments: Somewhat anxious appearing 51 yr old male   HENT:      Head: Normocephalic. Right Ear: External ear normal.      Left Ear: External ear normal.      Mouth/Throat:      Comments: superficial abrasion right lateral tongue  Eyes:      Conjunctiva/sclera: Conjunctivae normal.      Pupils: Pupils are equal, round, and reactive to light. Cardiovascular:      Rate and Rhythm: Regular rhythm. Tachycardia present. Heart sounds: Normal heart sounds. Pulmonary:      Effort: Pulmonary effort is normal.      Breath sounds: Normal breath sounds. Abdominal:      General: Bowel sounds are normal.      Palpations: Abdomen is soft. Tenderness: There is no abdominal tenderness. Musculoskeletal:         General: Normal range of motion. Cervical back: Normal range of motion. Comments: Moves extremities equally x4   Skin:     General: Skin is warm and dry. Neurological:      Mental Status: He is alert and oriented to person, place, and time.           Diagnostic Data:  CBC:  Recent Labs     02/05/22  1500   WBC 7.6   HGB 14.5   HCT 43.7        BMP:  Recent Labs 02/05/22  1500   *   K 4.2   CL 93*   CO2 19*   BUN 9   CREATININE 1.0   CALCIUM 9.7     Recent Labs     02/05/22  1500   AST 70*   ALT 55*   BILITOT 0.5   ALKPHOS 118     Coag Panel: No results for input(s): INR, PROTIME, APTT in the last 72 hours. Cardiac Enzymes: No results for input(s): Erle Keepers in the last 72 hours. ABGs:No results found for: PHART, PO2ART, ZIV3FJG  Urinalysis:  Lab Results   Component Value Date    NITRU Negative 02/05/2022    WBCUA 1 02/05/2022    BACTERIA NEGATIVE 02/05/2022    RBCUA 1 02/05/2022    BLOODU Negative 02/05/2022    SPECGRAV 1.014 02/05/2022    GLUCOSEU Negative 02/05/2022       CT Head WO Contrast    Result Date: 2/5/2022  CT head 2/5/2022 4:18 PM HISTORY: Seizure TECHNIQUE: Axial images of the head were obtained without IV contrast. Coronal and sagittal reformatted images are reconstructed and reviewed. COMPARISON: None. DLP: 821 mGy cm Automated exposure control was utilized to minimize patient radiation dose. FINDINGS: There is some mild cerebral volume loss. There is no intracranial hemorrhage or mass effect. No acute signs of ischemia. No extra-axial hematoma or subarachnoid hemorrhage. The visible paranasal sinuses and mastoid air cells are well-aerated. The bony calvarium appears intact. 1. Mild cerebral and cerebellar volume loss. No acute intracranial abnormality. Signed by Dr Vic Avery      Assessment/Plan:  Active Problems:    Alcohol withdrawal seizure with complication ([address]    KATHLEEN (generalized anxiety disorder)  Resolved Problems:    * No resolved hospital problems. *     Active Problems:    Alcohol withdrawal seizure with complication    -seizure precautions   -LR at 100cc/hr   -daily multivitamin   -daily thiamine   -social service consult   -wa protocol   -follow electrolytes   -monitor vitals   -telemetry    KATHLEEN (generalized anxiety disorder)   -continue paxil    Resolved Problems:    * No resolved hospital problems. *  Signed:  TIBURCIO Aldrich - CNP, 2/5/2022 5:38 PM

## 2022-02-05 NOTE — ED NOTES
Pt experienced seizure activity today per patient and his girlfriend. Pt states that he usually drinks about a pint of 80 proof vodka daily but has been trying to stop and has not had any vodka since Tuesday with only a few beers since then as well. Pt states that he had a similar issue with seizures when he tried to stop drinking in 2018/2019.      Heron Claude, RN  02/05/22 0978

## 2022-02-05 NOTE — ED NOTES
Bed: 06  Expected date:   Expected time:   Means of arrival:   Comments:  Gabrielle Mcelroy RN  02/05/22 7577

## 2022-02-06 LAB
ALBUMIN SERPL-MCNC: 3.7 G/DL (ref 3.5–5.2)
ALP BLD-CCNC: 103 U/L (ref 40–130)
ALT SERPL-CCNC: 41 U/L (ref 5–41)
ANION GAP SERPL CALCULATED.3IONS-SCNC: 14 MMOL/L (ref 7–19)
AST SERPL-CCNC: 62 U/L (ref 5–40)
BILIRUB SERPL-MCNC: 0.4 MG/DL (ref 0.2–1.2)
BILIRUBIN DIRECT: 0.1 MG/DL (ref 0–0.3)
BILIRUBIN, INDIRECT: 0.3 MG/DL (ref 0.1–1)
BUN BLDV-MCNC: 12 MG/DL (ref 6–20)
CALCIUM SERPL-MCNC: 8.8 MG/DL (ref 8.6–10)
CHLORIDE BLD-SCNC: 101 MMOL/L (ref 98–111)
CO2: 22 MMOL/L (ref 22–29)
CREAT SERPL-MCNC: 0.7 MG/DL (ref 0.5–1.2)
GFR AFRICAN AMERICAN: >59
GFR NON-AFRICAN AMERICAN: >60
GLUCOSE BLD-MCNC: 81 MG/DL (ref 74–109)
HCT VFR BLD CALC: 39.3 % (ref 42–52)
HEMOGLOBIN: 12.7 G/DL (ref 14–18)
INR BLD: 0.9 (ref 0.88–1.18)
MAGNESIUM: 2.3 MG/DL (ref 1.6–2.6)
MCH RBC QN AUTO: 32.7 PG (ref 27–31)
MCHC RBC AUTO-ENTMCNC: 32.3 G/DL (ref 33–37)
MCV RBC AUTO: 101.3 FL (ref 80–94)
PDW BLD-RTO: 15.3 % (ref 11.5–14.5)
PLATELET # BLD: 147 K/UL (ref 130–400)
PMV BLD AUTO: 10.7 FL (ref 9.4–12.4)
POTASSIUM SERPL-SCNC: 3.7 MMOL/L (ref 3.5–5)
PROTHROMBIN TIME: 12.4 SEC (ref 12–14.6)
RBC # BLD: 3.88 M/UL (ref 4.7–6.1)
SODIUM BLD-SCNC: 137 MMOL/L (ref 136–145)
TOTAL PROTEIN: 6.4 G/DL (ref 6.6–8.7)
WBC # BLD: 6.4 K/UL (ref 4.8–10.8)

## 2022-02-06 PROCEDURE — 6360000002 HC RX W HCPCS: Performed by: NURSE PRACTITIONER

## 2022-02-06 PROCEDURE — 85027 COMPLETE CBC AUTOMATED: CPT

## 2022-02-06 PROCEDURE — 2580000003 HC RX 258: Performed by: STUDENT IN AN ORGANIZED HEALTH CARE EDUCATION/TRAINING PROGRAM

## 2022-02-06 PROCEDURE — 2580000003 HC RX 258: Performed by: NURSE PRACTITIONER

## 2022-02-06 PROCEDURE — 85610 PROTHROMBIN TIME: CPT

## 2022-02-06 PROCEDURE — 80076 HEPATIC FUNCTION PANEL: CPT

## 2022-02-06 PROCEDURE — 80048 BASIC METABOLIC PNL TOTAL CA: CPT

## 2022-02-06 PROCEDURE — 36415 COLL VENOUS BLD VENIPUNCTURE: CPT

## 2022-02-06 PROCEDURE — 1210000000 HC MED SURG R&B

## 2022-02-06 PROCEDURE — 6370000000 HC RX 637 (ALT 250 FOR IP): Performed by: STUDENT IN AN ORGANIZED HEALTH CARE EDUCATION/TRAINING PROGRAM

## 2022-02-06 PROCEDURE — 83735 ASSAY OF MAGNESIUM: CPT

## 2022-02-06 RX ORDER — OXYMETAZOLINE HYDROCHLORIDE 0.05 G/100ML
2 SPRAY NASAL 2 TIMES DAILY PRN
Status: DISCONTINUED | OUTPATIENT
Start: 2022-02-06 | End: 2022-02-07 | Stop reason: HOSPADM

## 2022-02-06 RX ORDER — NICOTINE 21 MG/24HR
1 PATCH, TRANSDERMAL 24 HOURS TRANSDERMAL DAILY
Status: DISCONTINUED | OUTPATIENT
Start: 2022-02-06 | End: 2022-02-07 | Stop reason: HOSPADM

## 2022-02-06 RX ADMIN — Medication 10 ML: at 09:53

## 2022-02-06 RX ADMIN — LORAZEPAM 4 MG: 2 INJECTION INTRAMUSCULAR; INTRAVENOUS at 00:24

## 2022-02-06 RX ADMIN — LORAZEPAM 2 MG: 2 INJECTION INTRAMUSCULAR; INTRAVENOUS at 14:39

## 2022-02-06 RX ADMIN — THIAMINE HYDROCHLORIDE 100 MG: 100 INJECTION, SOLUTION INTRAMUSCULAR; INTRAVENOUS at 09:58

## 2022-02-06 RX ADMIN — LORAZEPAM 4 MG: 2 INJECTION INTRAMUSCULAR; INTRAVENOUS at 01:59

## 2022-02-06 RX ADMIN — OXYMETAZOLINE HCL 2 SPRAY: 0.05 SPRAY NASAL at 21:36

## 2022-02-06 RX ADMIN — SODIUM CHLORIDE, POTASSIUM CHLORIDE, SODIUM LACTATE AND CALCIUM CHLORIDE: 600; 310; 30; 20 INJECTION, SOLUTION INTRAVENOUS at 17:58

## 2022-02-06 RX ADMIN — LORAZEPAM 2 MG: 2 INJECTION INTRAMUSCULAR; INTRAVENOUS at 17:58

## 2022-02-06 RX ADMIN — LORAZEPAM 2 MG: 2 INJECTION INTRAMUSCULAR; INTRAVENOUS at 12:47

## 2022-02-06 RX ADMIN — LORAZEPAM 2 MG: 2 INJECTION INTRAMUSCULAR; INTRAVENOUS at 21:36

## 2022-02-06 NOTE — PROGRESS NOTES
Patient sleeping soundly at present time . Sao2 98% per room air pulse 74 . Respirations non labored @18.

## 2022-02-06 NOTE — PLAN OF CARE
Problem: Falls - Risk of:  Goal: Will remain free from falls  Description: Will remain free from falls  Outcome: Ongoing  Goal: Absence of physical injury  Description: Absence of physical injury  Outcome: Ongoing     Problem: Skin Integrity:  Goal: Will show no infection signs and symptoms  Description: Will show no infection signs and symptoms  Outcome: Ongoing  Goal: Absence of new skin breakdown  Description: Absence of new skin breakdown  Outcome: Ongoing     Problem: Discharge Planning:  Goal: Discharged to appropriate level of care  Description: Discharged to appropriate level of care  Outcome: Ongoing     Problem: Fluid Volume - Deficit:  Goal: Absence of fluid volume deficit signs and symptoms  Description: Absence of fluid volume deficit signs and symptoms  Outcome: Ongoing     Problem: Nutrition Deficit:  Goal: Ability to achieve adequate nutritional intake will improve  Description: Ability to achieve adequate nutritional intake will improve  Outcome: Ongoing     Problem: Sleep Pattern Disturbance:  Goal: Appears well-rested  Description: Appears well-rested  Outcome: Ongoing     Problem: Violence - Risk of, Self/Other-Directed:  Goal: Knowledge of developmental care interventions  Description: Absence of violence  Outcome: Ongoing

## 2022-02-06 NOTE — PROGRESS NOTES
Daily Progress Note    Date:2022  Patient: Cindy Beltran  : 1975  RE  CODE:Full Code No additional code details  PCP:Jayant Stanton    Admit Date: 2022  2:58 PM   LOS: 1 day       Subjective:      Events noted. He is having significant tremulousness, withdrawal symptoms with relatively high CIWA scores. He wants to know when he can go home. Summary: 58-year-old male with alcohol abuse with prior history of alcohol withdrawal seizure, previously drinking at least a pint of vodka and multiple beers daily, then 4 days prior to presentation decided to wean off alcohol by stopping vodka entirely, then prior to presentation his girlfriend noted that he had developed tremulousness, anxiety that was followed by a tonic-clonic seizure with tongue biting and urinary incontinence. Initial work-up including CT head with no acute findings but did note mild cerebral and cerebellar volume loss. UDS was negative, alcohol level negative, transaminitis noted likely secondary to alcohol abuse. Patient admitted and monitored on CIWA protocol.       Review of Systems    Comprehensive ROS completed and is negative except as otherwise noted      Objective:      Vital signs in last 24 hours:  Patient Vitals for the past 24 hrs:   BP Temp Temp src Pulse Resp SpO2 Height Weight   22 1206 (!) 143/85 98.1 °F (36.7 °C) Temporal 84 16 94 % -- --   22 0601 (!) 152/91 -- -- 96 20 -- -- --   22 0415 -- 97.1 °F (36.2 °C) Temporal 74 18 98 % -- --   22 0200 (!) 149/89 -- -- 97 24 -- -- --   22 0143 (!) 149/89 98.2 °F (36.8 °C) Temporal 97 20 96 % -- --   22 0015 127/84 -- -- 91 -- -- -- --   22 0011 127/84 99 °F (37.2 °C) Temporal 91 18 93 % -- --   22 2300 120/82 98.1 °F (36.7 °C) Temporal 102 22 97 % -- --   22 -- -- -- 112 -- -- -- --   22 -- -- -- 110 -- -- -- --   22 -- -- -- 114 -- -- -- --   22 (!) 142/91 97.5 °F (36.4 39/58/86 1411    folic acid (FOLVITE) tablet 1 mg, 1 mg, Oral, Daily, Ursula Sanchez MD        Assessment/plan  Active Problems:    Alcohol withdrawal seizure with complication (HCC)    KATHLEEN (generalized anxiety disorder)  Resolved Problems:    * No resolved hospital problems.  *      Continue CIWA protocol with as needed Ativan  Monitor hemodynamics  IV fluid  Supplement thiamine/multivitamin/folate    Monitor and replete electrolytes as indicated    Paxil for anxiety    Social work assistance for rehab    DVT prophylaxis Lovenox      Ursula Sanchez MD 2/6/2022 1:47 PM

## 2022-02-07 VITALS
SYSTOLIC BLOOD PRESSURE: 132 MMHG | TEMPERATURE: 97.5 F | WEIGHT: 200 LBS | RESPIRATION RATE: 20 BRPM | DIASTOLIC BLOOD PRESSURE: 102 MMHG | HEIGHT: 74 IN | BODY MASS INDEX: 25.67 KG/M2 | HEART RATE: 92 BPM | OXYGEN SATURATION: 98 %

## 2022-02-07 PROBLEM — N17.9 AKI (ACUTE KIDNEY INJURY) (HCC): Status: ACTIVE | Noted: 2022-02-07

## 2022-02-07 PROBLEM — R74.01 TRANSAMINITIS: Status: ACTIVE | Noted: 2022-02-07

## 2022-02-07 LAB
ALBUMIN SERPL-MCNC: 3.7 G/DL (ref 3.5–5.2)
ALP BLD-CCNC: 102 U/L (ref 40–130)
ALT SERPL-CCNC: 41 U/L (ref 5–41)
ANION GAP SERPL CALCULATED.3IONS-SCNC: 13 MMOL/L (ref 7–19)
AST SERPL-CCNC: 54 U/L (ref 5–40)
BILIRUB SERPL-MCNC: 0.4 MG/DL (ref 0.2–1.2)
BILIRUBIN DIRECT: 0.1 MG/DL (ref 0–0.3)
BILIRUBIN, INDIRECT: 0.3 MG/DL (ref 0.1–1)
BUN BLDV-MCNC: 11 MG/DL (ref 6–20)
CALCIUM SERPL-MCNC: 8.7 MG/DL (ref 8.6–10)
CHLORIDE BLD-SCNC: 104 MMOL/L (ref 98–111)
CO2: 24 MMOL/L (ref 22–29)
CREAT SERPL-MCNC: 0.7 MG/DL (ref 0.5–1.2)
EKG P AXIS: NORMAL DEGREES
EKG P-R INTERVAL: NORMAL MS
EKG Q-T INTERVAL: 326 MS
EKG QRS DURATION: 84 MS
EKG QTC CALCULATION (BAZETT): 411 MS
EKG T AXIS: 29 DEGREES
GFR AFRICAN AMERICAN: >59
GFR NON-AFRICAN AMERICAN: >60
GLUCOSE BLD-MCNC: 98 MG/DL (ref 74–109)
HCT VFR BLD CALC: 38.5 % (ref 42–52)
HEMOGLOBIN: 13 G/DL (ref 14–18)
MAGNESIUM: 2.2 MG/DL (ref 1.6–2.6)
MCH RBC QN AUTO: 33.8 PG (ref 27–31)
MCHC RBC AUTO-ENTMCNC: 33.8 G/DL (ref 33–37)
MCV RBC AUTO: 100 FL (ref 80–94)
PDW BLD-RTO: 14.8 % (ref 11.5–14.5)
PLATELET # BLD: 138 K/UL (ref 130–400)
PMV BLD AUTO: 10.6 FL (ref 9.4–12.4)
POTASSIUM SERPL-SCNC: 3.9 MMOL/L (ref 3.5–5)
RBC # BLD: 3.85 M/UL (ref 4.7–6.1)
SODIUM BLD-SCNC: 141 MMOL/L (ref 136–145)
TOTAL PROTEIN: 5.6 G/DL (ref 6.6–8.7)
WBC # BLD: 5.3 K/UL (ref 4.8–10.8)

## 2022-02-07 PROCEDURE — 6370000000 HC RX 637 (ALT 250 FOR IP): Performed by: NURSE PRACTITIONER

## 2022-02-07 PROCEDURE — 6370000000 HC RX 637 (ALT 250 FOR IP): Performed by: STUDENT IN AN ORGANIZED HEALTH CARE EDUCATION/TRAINING PROGRAM

## 2022-02-07 PROCEDURE — 85027 COMPLETE CBC AUTOMATED: CPT

## 2022-02-07 PROCEDURE — 80076 HEPATIC FUNCTION PANEL: CPT

## 2022-02-07 PROCEDURE — 80048 BASIC METABOLIC PNL TOTAL CA: CPT

## 2022-02-07 PROCEDURE — 6360000002 HC RX W HCPCS: Performed by: NURSE PRACTITIONER

## 2022-02-07 PROCEDURE — 36415 COLL VENOUS BLD VENIPUNCTURE: CPT

## 2022-02-07 PROCEDURE — 83735 ASSAY OF MAGNESIUM: CPT

## 2022-02-07 RX ORDER — CHLORDIAZEPOXIDE HYDROCHLORIDE 25 MG/1
25 CAPSULE, GELATIN COATED ORAL 4 TIMES DAILY PRN
Qty: 12 CAPSULE | Refills: 0 | Status: SHIPPED | OUTPATIENT
Start: 2022-02-07 | End: 2022-02-10

## 2022-02-07 RX ADMIN — THIAMINE HYDROCHLORIDE 100 MG: 100 INJECTION, SOLUTION INTRAMUSCULAR; INTRAVENOUS at 08:58

## 2022-02-07 RX ADMIN — THERA TABS 1 TABLET: TAB at 08:58

## 2022-02-07 RX ADMIN — PAROXETINE 10 MG: 10 TABLET, FILM COATED ORAL at 08:58

## 2022-02-07 RX ADMIN — FOLIC ACID 1 MG: 1 TABLET ORAL at 08:58

## 2022-02-07 RX ADMIN — LORAZEPAM 2 MG: 1 TABLET ORAL at 09:03

## 2022-02-07 RX ADMIN — LORAZEPAM 3 MG: 2 INJECTION INTRAMUSCULAR; INTRAVENOUS at 03:28

## 2022-02-07 ASSESSMENT — PAIN SCALES - GENERAL
PAINLEVEL_OUTOF10: 0
PAINLEVEL_OUTOF10: 0

## 2022-02-07 NOTE — DISCHARGE SUMMARY
Discharge Summary    NAME: Charmaine Wright  :  1975  MRN:  [id number]    Admit date:  2022  Discharge date:  2022    Admitting Physician:  Wero Gomez MD    Advance Directive: Full Code    Primary Care Physician:  Jeff Whitley    Discharge Diagnoses: Active Problems:    Alcohol withdrawal seizure with complication (HCC)    KATHLEEN (generalized anxiety disorder)    NICHOLAS (acute kidney injury) ([facility] [address]    Transaminitis        Significant Diagnostic Studies:   CT Head WO Contrast    Result Date: 2022  CT head 2022 4:18 PM HISTORY: Seizure TECHNIQUE: Axial images of the head were obtained without IV contrast. Coronal and sagittal reformatted images are reconstructed and reviewed. COMPARISON: None. DLP: 821 mGy cm Automated exposure control was utilized to minimize patient radiation dose. FINDINGS: There is some mild cerebral volume loss. There is no intracranial hemorrhage or mass effect. No acute signs of ischemia. No extra-axial hematoma or subarachnoid hemorrhage. The visible paranasal sinuses and mastoid air cells are well-aerated. The bony calvarium appears intact. 1. Mild cerebral and cerebellar volume loss. No acute intracranial abnormality.  Signed by Dr Lolly Evans      Pertinent Labs:   CBC:   Recent Labs     22  1500 22  0603 22  0314   WBC 7.6 6.4 5.3   HGB 14.5 12.7* 13.0*    147 138     BMP:    Recent Labs     22  1500 22  0603 22  0314   * 137 141   K 4.2 3.7 3.9   CL 93* 101 104   CO2 19* 22 24   BUN 9 12 11   CREATININE 1.0 0.7 0.7   GLUCOSE 108 81 98     INR:   Recent Labs     22  0603   INR 0.90             Hospital Course:     69-year-old male with alcohol abuse with prior history of alcohol withdrawal seizure, previously drinking at least a pint of vodka and multiple beers daily, then 4 days prior to presentation decided to wean off alcohol by stopping vodka entirely, then prior to presentation his girlfriend noted that he had developed tremulousness, anxiety that was followed by a tonic-clonic seizure with tongue biting and urinary incontinence. Initial work-up including CT head with no acute findings but did note mild cerebral and cerebellar volume loss. UDS was negative, alcohol level negative, transaminitis noted likely secondary to alcohol abuse. LFTs down trended while inpatient. Mild NICHOLAS noted which improved with IV fluids. Patient admitted and monitored on [facility] protocol. Had significant withdrawal symptoms day following admission, however this gradually improved. Resting comfortably on 2/7 with very mild tremulousness but no other withdrawal signs or symptoms. Patient wants to go home and otherwise medically stable. Discussed alcohol rehab resources. Patient notes he has previously been through alcohol rehab program, not interested in going again, but verbalized understanding of the steps he needs to take to remain abstinent from alcohol. Will continue on Librium as needed for alcohol withdrawal symptoms in the short-term and follow-up with his PCP within 1 week. Physical Exam:  Vital Signs: BP (!) 132/102 Comment: manual  Pulse 92   Temp 97.5 °F (36.4 °C) (Temporal)   Resp 20   Ht 6' 2\" (1.88 m)   Wt 200 lb (90.7 kg)   SpO2 98%   BMI 25.68 kg/m²   General appearance:. Alert and Cooperative   HEENT: Normocephalic. Atraumatic  Chest: clear to auscultation bilaterally without wheezes or rhonchi. Cardiac: Normal heart tones with regular rate and rhythm  Abdomen:soft, non-tender; normal bowel sounds. Extremities: mild tremulousness. No clubbing or cyanosis. No peripheral edema. Peripheral pulses palpable. Neurologic: Grossly intact. Discharge Medications:         Medication List      START taking these medications    chlordiazePOXIDE 25 MG capsule  Commonly known as: LIBRIUM  Take 1 capsule by mouth 4 times daily as needed (alcohol withdrawal) for up to 3 days.         CONTINUE taking these medications    oxymetazoline 0.05 % nasal spray  Commonly known as: AFRIN     PARoxetine 12.5 MG extended release tablet  Commonly known as: PAXIL CR     therapeutic multivitamin-minerals tablet           Where to Get Your Medications      These medications were sent to [facility] [address]  [address] [facility] [address]    Phone: [phone]   · chlordiazePOXIDE 25 MG capsule         Discharge Instructions: Follow up with Donnie Petit within 1 week. Take medications as directed. Resume activity as tolerated. Disposition: Patient is medically stable and will be discharged home. Time spent on discharge 35 minutes.      Signed:  Wes Luna MD  2/7/2022 11:29 AM

## 2022-10-27 ENCOUNTER — APPOINTMENT (OUTPATIENT)
Dept: CT IMAGING | Age: 47
DRG: 897 | End: 2022-10-27
Payer: MEDICAID

## 2022-10-27 ENCOUNTER — HOSPITAL ENCOUNTER (INPATIENT)
Age: 47
LOS: 5 days | Discharge: HOME | DRG: 897 | End: 2022-11-01
Attending: EMERGENCY MEDICINE | Admitting: INTERNAL MEDICINE
Payer: MEDICAID

## 2022-10-27 ENCOUNTER — APPOINTMENT (OUTPATIENT)
Dept: GENERAL RADIOLOGY | Age: 47
DRG: 897 | End: 2022-10-27
Payer: MEDICAID

## 2022-10-27 DIAGNOSIS — F10.920 ACUTE ALCOHOLIC INTOXICATION WITHOUT COMPLICATION (HCC): ICD-10-CM

## 2022-10-27 DIAGNOSIS — F32.A DEPRESSION, UNSPECIFIED DEPRESSION TYPE: Primary | ICD-10-CM

## 2022-10-27 DIAGNOSIS — R29.6 MULTIPLE FALLS: ICD-10-CM

## 2022-10-27 PROBLEM — F10.220 ALCOHOL INTOXICATION IN ACTIVE ALCOHOLIC WITHOUT COMPLICATION (HCC): Status: ACTIVE | Noted: 2022-10-27

## 2022-10-27 LAB
ALBUMIN SERPL-MCNC: 3.2 G/DL (ref 3.5–5.2)
ALP BLD-CCNC: 172 U/L (ref 40–130)
ALT SERPL-CCNC: 40 U/L (ref 5–41)
AMPHETAMINE SCREEN, URINE: NEGATIVE
ANION GAP SERPL CALCULATED.3IONS-SCNC: 14 MMOL/L (ref 7–19)
AST SERPL-CCNC: 99 U/L (ref 5–40)
BACTERIA: NEGATIVE /HPF
BARBITURATE SCREEN URINE: NEGATIVE
BENZODIAZEPINE SCREEN, URINE: POSITIVE
BILIRUB SERPL-MCNC: 0.3 MG/DL (ref 0.2–1.2)
BILIRUBIN URINE: NEGATIVE
BLOOD, URINE: NEGATIVE
BUN BLDV-MCNC: 8 MG/DL (ref 6–20)
BUPRENORPHINE URINE: NEGATIVE
CALCIUM SERPL-MCNC: 9.1 MG/DL (ref 8.6–10)
CANNABINOID SCREEN URINE: NEGATIVE
CHLORIDE BLD-SCNC: 100 MMOL/L (ref 98–111)
CLARITY: CLEAR
CO2: 28 MMOL/L (ref 22–29)
COCAINE METABOLITE SCREEN URINE: NEGATIVE
COLOR: YELLOW
CREAT SERPL-MCNC: 0.6 MG/DL (ref 0.5–1.2)
CRYSTALS, UA: ABNORMAL /HPF
EKG P AXIS: 81 DEGREES
EKG P-R INTERVAL: 152 MS
EKG Q-T INTERVAL: 368 MS
EKG QRS DURATION: 98 MS
EKG QTC CALCULATION (BAZETT): 415 MS
EKG T AXIS: 45 DEGREES
EPITHELIAL CELLS, UA: 0 /HPF (ref 0–5)
ETHANOL: 394 MG/DL (ref 0–0.08)
ETHANOL: 56 MG/DL (ref 0–0.08)
GFR SERPL CREATININE-BSD FRML MDRD: >60 ML/MIN/{1.73_M2}
GLUCOSE BLD-MCNC: 86 MG/DL (ref 74–109)
GLUCOSE URINE: NEGATIVE MG/DL
HCT VFR BLD CALC: 37 % (ref 42–52)
HEMOGLOBIN: 12.7 G/DL (ref 14–18)
HYALINE CASTS: 1 /HPF (ref 0–8)
KETONES, URINE: NEGATIVE MG/DL
LEUKOCYTE ESTERASE, URINE: NEGATIVE
Lab: ABNORMAL
MCH RBC QN AUTO: 35 PG (ref 27–31)
MCHC RBC AUTO-ENTMCNC: 34.3 G/DL (ref 33–37)
MCV RBC AUTO: 101.9 FL (ref 80–94)
METHADONE SCREEN, URINE: NEGATIVE
METHAMPHETAMINE, URINE: NEGATIVE
NITRITE, URINE: NEGATIVE
OPIATE SCREEN URINE: NEGATIVE
OXYCODONE URINE: NEGATIVE
PDW BLD-RTO: 12.9 % (ref 11.5–14.5)
PH UA: 6.5 (ref 5–8)
PHENCYCLIDINE SCREEN URINE: NEGATIVE
PLATELET # BLD: 112 K/UL (ref 130–400)
PMV BLD AUTO: 9.9 FL (ref 9.4–12.4)
POTASSIUM SERPL-SCNC: 3.4 MMOL/L (ref 3.5–5)
PROPOXYPHENE SCREEN: NEGATIVE
PROTEIN UA: 30 MG/DL
RBC # BLD: 3.63 M/UL (ref 4.7–6.1)
RBC UA: 2 /HPF (ref 0–4)
SARS-COV-2, NAAT: NOT DETECTED
SODIUM BLD-SCNC: 142 MMOL/L (ref 136–145)
SPECIFIC GRAVITY UA: 1.01 (ref 1–1.03)
TOTAL PROTEIN: 6.5 G/DL (ref 6.6–8.7)
TRICYCLIC, URINE: NEGATIVE
TROPONIN: <0.01 NG/ML (ref 0–0.03)
UROBILINOGEN, URINE: 1 E.U./DL
WBC # BLD: 4.5 K/UL (ref 4.8–10.8)
WBC UA: 0 /HPF (ref 0–5)

## 2022-10-27 PROCEDURE — 93005 ELECTROCARDIOGRAM TRACING: CPT | Performed by: EMERGENCY MEDICINE

## 2022-10-27 PROCEDURE — 72170 X-RAY EXAM OF PELVIS: CPT | Performed by: RADIOLOGY

## 2022-10-27 PROCEDURE — 2580000003 HC RX 258: Performed by: NURSE PRACTITIONER

## 2022-10-27 PROCEDURE — 6370000000 HC RX 637 (ALT 250 FOR IP): Performed by: EMERGENCY MEDICINE

## 2022-10-27 PROCEDURE — 80053 COMPREHEN METABOLIC PANEL: CPT

## 2022-10-27 PROCEDURE — 1210000000 HC MED SURG R&B

## 2022-10-27 PROCEDURE — 81001 URINALYSIS AUTO W/SCOPE: CPT

## 2022-10-27 PROCEDURE — 2580000003 HC RX 258: Performed by: EMERGENCY MEDICINE

## 2022-10-27 PROCEDURE — 99285 EMERGENCY DEPT VISIT HI MDM: CPT

## 2022-10-27 PROCEDURE — 72170 X-RAY EXAM OF PELVIS: CPT

## 2022-10-27 PROCEDURE — 82077 ASSAY SPEC XCP UR&BREATH IA: CPT

## 2022-10-27 PROCEDURE — 80306 DRUG TEST PRSMV INSTRMNT: CPT

## 2022-10-27 PROCEDURE — 36415 COLL VENOUS BLD VENIPUNCTURE: CPT

## 2022-10-27 PROCEDURE — 70450 CT HEAD/BRAIN W/O DYE: CPT

## 2022-10-27 PROCEDURE — 84484 ASSAY OF TROPONIN QUANT: CPT

## 2022-10-27 PROCEDURE — 6370000000 HC RX 637 (ALT 250 FOR IP): Performed by: NURSE PRACTITIONER

## 2022-10-27 PROCEDURE — 72125 CT NECK SPINE W/O DYE: CPT | Performed by: RADIOLOGY

## 2022-10-27 PROCEDURE — 87635 SARS-COV-2 COVID-19 AMP PRB: CPT

## 2022-10-27 PROCEDURE — 6360000002 HC RX W HCPCS: Performed by: NURSE PRACTITIONER

## 2022-10-27 PROCEDURE — 71045 X-RAY EXAM CHEST 1 VIEW: CPT

## 2022-10-27 PROCEDURE — 72125 CT NECK SPINE W/O DYE: CPT

## 2022-10-27 PROCEDURE — 70450 CT HEAD/BRAIN W/O DYE: CPT | Performed by: RADIOLOGY

## 2022-10-27 PROCEDURE — 85027 COMPLETE CBC AUTOMATED: CPT

## 2022-10-27 RX ORDER — GAUZE BANDAGE 2" X 2"
100 BANDAGE TOPICAL DAILY
Status: DISCONTINUED | OUTPATIENT
Start: 2022-10-27 | End: 2022-11-01 | Stop reason: HOSPADM

## 2022-10-27 RX ORDER — LORAZEPAM 2 MG/ML
2 INJECTION INTRAMUSCULAR
Status: DISCONTINUED | OUTPATIENT
Start: 2022-10-27 | End: 2022-10-31

## 2022-10-27 RX ORDER — MULTIVITAMIN WITH IRON
1 TABLET ORAL DAILY
Status: DISCONTINUED | OUTPATIENT
Start: 2022-10-27 | End: 2022-11-01 | Stop reason: HOSPADM

## 2022-10-27 RX ORDER — SODIUM CHLORIDE, SODIUM LACTATE, POTASSIUM CHLORIDE, CALCIUM CHLORIDE 600; 310; 30; 20 MG/100ML; MG/100ML; MG/100ML; MG/100ML
INJECTION, SOLUTION INTRAVENOUS CONTINUOUS
Status: DISCONTINUED | OUTPATIENT
Start: 2022-10-27 | End: 2022-11-01 | Stop reason: HOSPADM

## 2022-10-27 RX ORDER — SODIUM CHLORIDE 9 MG/ML
INJECTION, SOLUTION INTRAVENOUS PRN
Status: DISCONTINUED | OUTPATIENT
Start: 2022-10-27 | End: 2022-11-01 | Stop reason: HOSPADM

## 2022-10-27 RX ORDER — LORAZEPAM 2 MG/ML
1 INJECTION INTRAMUSCULAR
Status: DISCONTINUED | OUTPATIENT
Start: 2022-10-27 | End: 2022-10-31

## 2022-10-27 RX ORDER — ACETAMINOPHEN 325 MG/1
650 TABLET ORAL EVERY 6 HOURS PRN
Status: DISCONTINUED | OUTPATIENT
Start: 2022-10-27 | End: 2022-11-01 | Stop reason: HOSPADM

## 2022-10-27 RX ORDER — ONDANSETRON 4 MG/1
4 TABLET, ORALLY DISINTEGRATING ORAL EVERY 8 HOURS PRN
Status: DISCONTINUED | OUTPATIENT
Start: 2022-10-27 | End: 2022-11-01 | Stop reason: HOSPADM

## 2022-10-27 RX ORDER — M-VIT,TX,IRON,MINS/CALC/FOLIC 27MG-0.4MG
1 TABLET ORAL DAILY
COMMUNITY

## 2022-10-27 RX ORDER — LORAZEPAM 2 MG/1
2 TABLET ORAL
Status: DISCONTINUED | OUTPATIENT
Start: 2022-10-27 | End: 2022-10-31

## 2022-10-27 RX ORDER — SODIUM CHLORIDE 0.9 % (FLUSH) 0.9 %
5-40 SYRINGE (ML) INJECTION PRN
Status: DISCONTINUED | OUTPATIENT
Start: 2022-10-27 | End: 2022-11-01 | Stop reason: HOSPADM

## 2022-10-27 RX ORDER — SODIUM CHLORIDE 0.9 % (FLUSH) 0.9 %
5-40 SYRINGE (ML) INJECTION EVERY 12 HOURS SCHEDULED
Status: DISCONTINUED | OUTPATIENT
Start: 2022-10-27 | End: 2022-11-01 | Stop reason: HOSPADM

## 2022-10-27 RX ORDER — POTASSIUM CHLORIDE 20 MEQ/1
40 TABLET, EXTENDED RELEASE ORAL PRN
Status: DISCONTINUED | OUTPATIENT
Start: 2022-10-27 | End: 2022-11-01 | Stop reason: HOSPADM

## 2022-10-27 RX ORDER — ONDANSETRON 2 MG/ML
4 INJECTION INTRAMUSCULAR; INTRAVENOUS EVERY 6 HOURS PRN
Status: DISCONTINUED | OUTPATIENT
Start: 2022-10-27 | End: 2022-11-01 | Stop reason: HOSPADM

## 2022-10-27 RX ORDER — MAGNESIUM SULFATE IN WATER 40 MG/ML
2000 INJECTION, SOLUTION INTRAVENOUS PRN
Status: DISCONTINUED | OUTPATIENT
Start: 2022-10-27 | End: 2022-11-01 | Stop reason: HOSPADM

## 2022-10-27 RX ORDER — POTASSIUM CHLORIDE 7.45 MG/ML
10 INJECTION INTRAVENOUS PRN
Status: DISCONTINUED | OUTPATIENT
Start: 2022-10-27 | End: 2022-11-01 | Stop reason: HOSPADM

## 2022-10-27 RX ORDER — SODIUM CHLORIDE 9 MG/ML
INJECTION, SOLUTION INTRAVENOUS CONTINUOUS
Status: DISCONTINUED | OUTPATIENT
Start: 2022-10-27 | End: 2022-10-27

## 2022-10-27 RX ORDER — LORAZEPAM 2 MG/1
4 TABLET ORAL
Status: DISCONTINUED | OUTPATIENT
Start: 2022-10-27 | End: 2022-10-31

## 2022-10-27 RX ORDER — LORAZEPAM 1 MG/1
1 TABLET ORAL
Status: DISCONTINUED | OUTPATIENT
Start: 2022-10-27 | End: 2022-10-31

## 2022-10-27 RX ORDER — ENOXAPARIN SODIUM 100 MG/ML
30 INJECTION SUBCUTANEOUS 2 TIMES DAILY
Status: DISCONTINUED | OUTPATIENT
Start: 2022-10-27 | End: 2022-11-01 | Stop reason: HOSPADM

## 2022-10-27 RX ORDER — LORAZEPAM 2 MG/ML
3 INJECTION INTRAMUSCULAR
Status: DISCONTINUED | OUTPATIENT
Start: 2022-10-27 | End: 2022-10-31

## 2022-10-27 RX ORDER — ACETAMINOPHEN 650 MG/1
650 SUPPOSITORY RECTAL EVERY 6 HOURS PRN
Status: DISCONTINUED | OUTPATIENT
Start: 2022-10-27 | End: 2022-11-01 | Stop reason: HOSPADM

## 2022-10-27 RX ORDER — LORAZEPAM 2 MG/ML
4 INJECTION INTRAMUSCULAR
Status: DISCONTINUED | OUTPATIENT
Start: 2022-10-27 | End: 2022-10-31

## 2022-10-27 RX ORDER — POLYETHYLENE GLYCOL 3350 17 G/17G
17 POWDER, FOR SOLUTION ORAL DAILY PRN
Status: DISCONTINUED | OUTPATIENT
Start: 2022-10-27 | End: 2022-11-01 | Stop reason: HOSPADM

## 2022-10-27 RX ORDER — NICOTINE 21 MG/24HR
1 PATCH, TRANSDERMAL 24 HOURS TRANSDERMAL DAILY
Status: DISCONTINUED | OUTPATIENT
Start: 2022-10-27 | End: 2022-11-01 | Stop reason: HOSPADM

## 2022-10-27 RX ADMIN — SODIUM CHLORIDE, SODIUM LACTATE, POTASSIUM CHLORIDE, AND CALCIUM CHLORIDE: 600; 310; 30; 20 INJECTION, SOLUTION INTRAVENOUS at 19:38

## 2022-10-27 RX ADMIN — SODIUM CHLORIDE: 9 INJECTION, SOLUTION INTRAVENOUS at 08:17

## 2022-10-27 RX ADMIN — ACETAMINOPHEN 650 MG: 325 TABLET ORAL at 17:20

## 2022-10-27 RX ADMIN — LORAZEPAM 3 MG: 2 INJECTION INTRAMUSCULAR; INTRAVENOUS at 20:51

## 2022-10-27 RX ADMIN — ENOXAPARIN SODIUM 30 MG: 100 INJECTION SUBCUTANEOUS at 12:18

## 2022-10-27 RX ADMIN — LORAZEPAM 3 MG: 2 INJECTION INTRAMUSCULAR; INTRAVENOUS at 19:39

## 2022-10-27 RX ADMIN — THERA TABS 1 TABLET: TAB at 12:18

## 2022-10-27 RX ADMIN — THIAMINE HCL TAB 100 MG 100 MG: 100 TAB at 08:18

## 2022-10-27 RX ADMIN — LORAZEPAM 3 MG: 2 INJECTION INTRAMUSCULAR; INTRAVENOUS at 23:43

## 2022-10-27 RX ADMIN — LORAZEPAM 3 MG: 2 INJECTION INTRAMUSCULAR; INTRAVENOUS at 17:42

## 2022-10-27 RX ADMIN — SODIUM CHLORIDE, SODIUM LACTATE, POTASSIUM CHLORIDE, AND CALCIUM CHLORIDE: 600; 310; 30; 20 INJECTION, SOLUTION INTRAVENOUS at 12:18

## 2022-10-27 RX ADMIN — ENOXAPARIN SODIUM 30 MG: 100 INJECTION SUBCUTANEOUS at 20:50

## 2022-10-27 ASSESSMENT — ENCOUNTER SYMPTOMS
NAUSEA: 1
ABDOMINAL PAIN: 0
DIARRHEA: 0
CHEST TIGHTNESS: 0
BACK PAIN: 0
COLOR CHANGE: 0
SHORTNESS OF BREATH: 0
VOMITING: 0
EYE PAIN: 0

## 2022-10-27 ASSESSMENT — PAIN SCALES - GENERAL
PAINLEVEL_OUTOF10: 7
PAINLEVEL_OUTOF10: 8

## 2022-10-27 ASSESSMENT — PAIN DESCRIPTION - LOCATION
LOCATION: RIB CAGE
LOCATION: BACK;RIB CAGE

## 2022-10-27 ASSESSMENT — PAIN - FUNCTIONAL ASSESSMENT
PAIN_FUNCTIONAL_ASSESSMENT: ACTIVITIES ARE NOT PREVENTED
PAIN_FUNCTIONAL_ASSESSMENT: NONE - DENIES PAIN

## 2022-10-27 ASSESSMENT — PAIN DESCRIPTION - ORIENTATION
ORIENTATION: RIGHT;LEFT
ORIENTATION: RIGHT

## 2022-10-27 ASSESSMENT — LIFESTYLE VARIABLES
HOW MANY STANDARD DRINKS CONTAINING ALCOHOL DO YOU HAVE ON A TYPICAL DAY: 10 OR MORE
HOW OFTEN DO YOU HAVE A DRINK CONTAINING ALCOHOL: 4 OR MORE TIMES A WEEK

## 2022-10-27 ASSESSMENT — PAIN DESCRIPTION - DESCRIPTORS: DESCRIPTORS: SHARP

## 2022-10-27 ASSESSMENT — PAIN DESCRIPTION - PAIN TYPE: TYPE: ACUTE PAIN

## 2022-10-27 NOTE — ED PROVIDER NOTES
[facility] EMERGENCY DEPT  eMERGENCY dEPARTMENT eNCOUnter      Pt Name: Rashad De La Garza  MRN: [id number]  [place] 1975  Date of evaluation: 10/27/2022  Provider: Clari Henson MD    CHIEF COMPLAINT       Chief Complaint   Patient presents with    Alcohol Intoxication     Drank 2 pints of whiskey tonight, wants to go to rehab         HISTORY OF PRESENT ILLNESS   (Location/Symptom, Timing/Onset,Context/Setting, Quality, Duration, Modifying Factors, Severity)  Note limiting factors. Rashad De La Garza is a 49 y.o. male who presents to the emergency department due to alcohol intoxication and problems with falling frequently. Patient said that he has been drinking heavily for quite some time. Said that over the last several weeks has had multiple falls. Denies any pain or injury from this. Has several bruises to his torso arms and legs but denies any pain anywhere. Says he is depressed. Denies HI or SI. No hallucinations or delusions. Feeling very shaky and unsteady when he walks. Feels much more shaky when he stops drinking. Does have a history of seizures. Wants to stop drinking. HPI    NursingNotes were reviewed. REVIEW OF SYSTEMS    (2-9 systems for level 4, 10 or more for level 5)     Review of Systems   Constitutional:  Negative for fever. Eyes:  Negative for pain and visual disturbance. Respiratory:  Negative for shortness of breath. Cardiovascular:  Negative for chest pain and palpitations. Gastrointestinal:  Negative for abdominal pain, diarrhea and vomiting. Genitourinary:  Negative for dysuria. Musculoskeletal:  Positive for gait problem. Negative for back pain, neck pain and neck stiffness. Skin:  Negative for rash. Neurological:  Positive for tremors and seizures (none recently). Negative for syncope, weakness, numbness and headaches. Psychiatric/Behavioral:  Negative for hallucinations and suicidal ideas. All other systems reviewed and are negative.     A complete review of systems was performed and is negative except as noted above in the HPI. PAST MEDICAL HISTORY     Past Medical History:   Diagnosis Date    Anxiety     Seizures ([address]          SURGICAL HISTORY       Past Surgical History:   Procedure Laterality Date    EYE SURGERY      ORTHOPEDIC SURGERY           CURRENT MEDICATIONS       Previous Medications    No medications on file       ALLERGIES     Bactrim [sulfamethoxazole-trimethoprim]    FAMILY HISTORY     History reviewed. No pertinent family history. SOCIAL HISTORY       Social History     Socioeconomic History    Marital status:      Spouse name: None    Number of children: None    Years of education: None    Highest education level: None   Tobacco Use    Smoking status: Every Day     Types: Cigars    Smokeless tobacco: Never   Vaping Use    Vaping Use: Never used   Substance and Sexual Activity    Alcohol use: Yes     Comment: 2 pints of whiskey daily    Drug use: Yes     Frequency: 1.0 times per week     Types: Methamphetamines (Crystal Meth)       SCREENINGS    Linda Coma Scale  Eye Opening: Spontaneous  Best Verbal Response: Oriented  Best Motor Response: Obeys commands  Linda Coma Scale Score: 15        PHYSICAL EXAM    (up to 7 for level 4, 8 or more for level 5)     ED Triage Vitals [10/27/22 0552]   BP Temp Temp Source Heart Rate Resp SpO2 Height Weight   135/84 98 °F (36.7 °C) Oral 95 18 92 % 6' 2\" (1.88 m) 225 lb (102.1 kg)       Physical Exam  Vitals reviewed. Constitutional:       General: He is not in acute distress. Appearance: He is well-developed. HENT:      Head: Normocephalic and atraumatic. Right Ear: Tympanic membrane, ear canal and external ear normal.      Left Ear: Tympanic membrane and external ear normal.      Ears:      Comments: Abrasion inside left ear canal without signs of infection     Mouth/Throat:      Mouth: Mucous membranes are moist.      Pharynx: Oropharynx is clear.    Eyes:      General: No scleral icterus. Extraocular Movements: Extraocular movements intact. Conjunctiva/sclera: Conjunctivae normal.      Pupils: Pupils are equal, round, and reactive to light. Neck:      Vascular: No JVD. Cardiovascular:      Rate and Rhythm: Normal rate and regular rhythm. Pulses: Normal pulses. Heart sounds: Normal heart sounds. Pulmonary:      Effort: Pulmonary effort is normal. No respiratory distress. Breath sounds: Normal breath sounds. Abdominal:      General: There is no distension. Palpations: Abdomen is soft. Tenderness: There is no abdominal tenderness. There is no right CVA tenderness, left CVA tenderness, guarding or rebound. Musculoskeletal:         General: No tenderness or deformity. Normal range of motion. Cervical back: Normal range of motion and neck supple. No rigidity or tenderness. Comments: Multiple areas of bruising to the torso arms and legs. No tenderness anywhere. Skin:     General: Skin is warm and dry. Capillary Refill: Capillary refill takes less than 2 seconds. Neurological:      General: No focal deficit present. Mental Status: He is alert and oriented to person, place, and time. GCS: GCS eye subscore is 4. GCS verbal subscore is 5. GCS motor subscore is 6. Sensory: Sensation is intact. Motor: Motor function is intact. Psychiatric:         Mood and Affect: Mood is depressed. Speech: Speech normal.         Behavior: Behavior normal.         Thought Content: Thought content normal.       DIAGNOSTIC RESULTS     EKG: All EKG's are interpreted by the Emergency Department Physician who either signs or Co-signs this chart in the absence of a cardiologist.    Normal sinus rhythm. Normal QT. Mild artifact. No signs of acute ischemia.     RADIOLOGY:   Non-plain film images such as CT, Ultrasound and MRI are read by the radiologist. Plainradiographic images are visualized and preliminarily interpreted by the emergency physician with the below findings:        Interpretation per the Radiologist below, if available at the time of this note:    XR CHEST PORTABLE   Final Result   No active pulmonary disease. Recommendation: Follow up as clinically indicated. Electronically Signed by Eder Fischer MD at 27-Oct-2022 09:42:49 AM EST               XR PELVIS (1-2 VIEWS)   Final Result   No discernible acute osseous abnormality. Recommendation:    Follow up as clinically indicated. Electronically Signed by Berta Vanessa MD at 27-Oct-2022 09:54:54 AM EST               CT CERVICAL SPINE WO CONTRAST   Final Result   1. No acute osseous abnormality. 2.  Degenerative changes as above. Recommendation: Follow up as clinically indicated. All CT scans at this facility utilize dose modulation, iterative reconstruction, and/or weight based dosing when appropriate to reduce radiation dose to as low as reasonably achievable. Electronically Signed by Berta Vanessa MD at 27-Oct-2022 09:18:45 AM EST               CT HEAD WO CONTRAST   Final Result   1. Mild atrophic changes. 2.  Otherwise no acute intracranial abnormalities. Recommendation: Follow up as clinically indicated. All CT scans at this facility utilize dose modulation, iterative reconstruction, and/or weight based dosing when appropriate to reduce radiation dose to as low as reasonably achievable.     Electronically Signed by Mayra Corey MD at 27-Oct-2022 09:15:26 AM EST                     ED BEDSIDE ULTRASOUND:   Performed by ED Physician - none    LABS:  Labs Reviewed   CBC - Abnormal; Notable for the following components:       Result Value    WBC 4.5 (*)     RBC 3.63 (*)     Hemoglobin 12.7 (*)     Hematocrit 37.0 (*)     .9 (*)     MCH 35.0 (*)     Platelets 652 (*)     All other components within normal limits   COMPREHENSIVE METABOLIC PANEL - Abnormal; Notable for the following components:    Potassium 3.4 (*)     Total Protein 6.5 (*)     Albumin 3.2 (*)     Alkaline Phosphatase 172 (*)     AST 99 (*)     All other components within normal limits   DRUG SCRN, BUPRENORPHINE - Abnormal; Notable for the following components:    Benzodiazepine Screen, Urine POSITIVE (*)     All other components within normal limits   URINALYSIS WITH REFLEX TO CULTURE - Abnormal; Notable for the following components:    Protein, UA 30 (*)     All other components within normal limits   MICROSCOPIC URINALYSIS - Abnormal; Notable for the following components:    Bacteria, UA NEGATIVE (*)     Crystals, UA NEG (*)     All other components within normal limits   COVID-19, RAPID   ETHANOL   TROPONIN       All other labs were within normal range or not returned as of this dictation. EMERGENCY DEPARTMENT COURSE and DIFFERENTIALDIAGNOSIS/MDM:   Vitals:    Vitals:    10/27/22 0552 10/27/22 0700 10/27/22 0822   BP: 135/84 134/82 115/83   Pulse: 95 92 97   Resp: 18 18    Temp: 98 °F (36.7 °C)     TempSrc: Oral     SpO2: 92%  90%   Weight: 225 lb (102.1 kg)     Height: 6' 2\" (1.88 m)         MDM    Patient's case discussed with Dorina with hospitalist service who is agreeable plan of care and admission. Patient resting comfortably at this time and updated about plan. CONSULTS:  IP CONSULT TO SOCIAL WORK    PROCEDURES:  Unless otherwise notedbelow, none     Procedures    FINAL IMPRESSION     1. Depression, unspecified depression type    2. Acute alcoholic intoxication without complication ([facility] [address]    3.  Multiple falls          DISPOSITION/PLAN   DISPOSITION Admitted 10/27/2022 08:39:05 AM      PATIENT REFERRED TO:  @FUP@    DISCHARGE MEDICATIONS:  New Prescriptions    No medications on file          (Please note that portions of this note were completed with a voice recognition program.  Efforts were made to edit the dictations butoccasionally words are mis-transcribed.)    Neo Almonte MD (electronically signed)  AttendingEmergency Physician

## 2022-10-27 NOTE — H&P
[address] - History & Physical      PCP: Charanjit Peterson    Date of Admission: 10/27/2022    Date of Service: 10/27/2022    Chief Complaint: Alcohol intoxication    History Of Present Illness: The patient is a 49 y.o. male with past medical history of anxiety and seizures who presented to [facility] ED complaining of alcohol intoxication. Patient reported to the ED indicating he had been drinking heavily recently. Patient states he would like rehab. Patient also indicated having multiple falls over the past few weeks. Patient denied any injury. On examination in the ED patient was noted to have multiple bruises scattered throughout his body in multiple stages of healing. Patient indicated depression but denied SI or HI. Patient denies hallucinations or delusions. Chart review indicates patient has history of seizures with alcohol withdrawal in the past.  Work-up in ED revealed potassium 3.4, alk phos 172, AST 99, WBC of 4.5, hemoglobin 12.7, hematocrit of 37.0, and ethanol level of 394. UDS positive for benzodiazepines. Decision made to admit patient to the hospitalist service for alcohol intoxication. Past Medical History:        Diagnosis Date    Anxiety     Psychiatric problem     Seizures ([address]        Past Surgical History:        Procedure Laterality Date    EYE SURGERY      ORTHOPEDIC SURGERY         Home Medications:  Prior to Admission medications    Medication Sig Start Date End Date Taking? Authorizing Provider   Multiple Vitamins-Minerals (THERAPEUTIC MULTIVITAMIN-MINERALS) tablet Take 1 tablet by mouth daily   Yes Historical Provider, MD       Allergies:    Bactrim [sulfamethoxazole-trimethoprim]    Social History:    The patient currently lives at home alone  Tobacco:   reports that he has been smoking cigars. He has never used smokeless tobacco.  Alcohol:   reports current alcohol use.   Illicit Drugs: denies however UDS positive for benzodiazepines    Family History:  History reviewed. No pertinent family history. Review of Systems:   Review of Systems   Constitutional:  Positive for appetite change and fatigue. Negative for chills and fever. Respiratory:  Negative for chest tightness and shortness of breath. Cardiovascular:  Negative for chest pain, palpitations and leg swelling. Gastrointestinal:  Positive for nausea. Negative for abdominal pain, diarrhea and vomiting. Genitourinary:  Negative for difficulty urinating and hematuria. Musculoskeletal:  Positive for gait problem. Skin:  Negative for color change and wound. Reports multiple bruises   Neurological:  Negative for dizziness, weakness, numbness and headaches. Psychiatric/Behavioral:  Negative for agitation, confusion, self-injury and suicidal ideas. 14 point review of systems is negative except as specifically addressed above. Physical Examination:  /86   Pulse 71   Temp 99 °F (37.2 °C) (Oral)   Resp 18   Ht 6' 2\" (1.88 m)   Wt 224 lb 12.8 oz (102 kg)   SpO2 90%   BMI 28.86 kg/m²   Physical Exam  Vitals reviewed. Constitutional:       Appearance: He is ill-appearing. Eyes:      Pupils: Pupils are equal, round, and reactive to light. Cardiovascular:      Rate and Rhythm: Normal rate and regular rhythm. Pulses: Normal pulses. Heart sounds: Normal heart sounds. Pulmonary:      Effort: Pulmonary effort is normal.      Breath sounds: Normal breath sounds. Abdominal:      General: Abdomen is flat. Bowel sounds are normal. There is no distension. Palpations: Abdomen is soft. Tenderness: There is no abdominal tenderness. There is no guarding. Musculoskeletal:         General: Normal range of motion. Right lower leg: No edema. Left lower leg: No edema. Skin:     General: Skin is warm and dry. Capillary Refill: Capillary refill takes less than 2 seconds. Findings: Bruising present.    Neurological:      General: No focal deficit present. Mental Status: He is alert and oriented to person, place, and time. Diagnostic Data:  CBC:  Recent Labs     10/27/22  0556   WBC 4.5*   HGB 12.7*   HCT 37.0*   *     BMP:  Recent Labs     10/27/22  0556      K 3.4*      CO2 28   BUN 8   CREATININE 0.6   CALCIUM 9.1     Recent Labs     10/27/22  0556   AST 99*   ALT 40   BILITOT 0.3   ALKPHOS 172*     Coag Panel: No results for input(s): INR, PROTIME, APTT in the last 72 hours. Cardiac Enzymes:   Recent Labs     10/27/22  0711   TROPONINI <0.01     ABGs:No results found for: PHART, PO2ART, IUE2PUD  Urinalysis:  Lab Results   Component Value Date/Time    NITRU Negative 10/27/2022 07:11 AM    WBCUA 0 10/27/2022 07:11 AM    BACTERIA NEGATIVE 10/27/2022 07:11 AM    RBCUA 2 10/27/2022 07:11 AM    BLOODU Negative 10/27/2022 07:11 AM    SPECGRAV 1.015 10/27/2022 07:11 AM    GLUCOSEU Negative 10/27/2022 07:11 AM     A1C: No results for input(s): LABA1C in the last 72 hours. ABG:No results for input(s): PHART, BLH4NBZ, PO2ART, GFI0PMG, BEART, HGBAE, K8PKFHQB, CARBOXHGBART in the last 72 hours. XR PELVIS (1-2 VIEWS)    Result Date: 10/27/2022  NO PRIOR REPORT AVAILABLE Exam:X-RAY OF THE PELVIS Clinical data:Multiple falls. Technique:Single frontal view of the pelvis. Prior studies: No prior studies submitted. Findings: Single view of the pelvis demonstrates intact proximal right and left femurs. The acetabular joints,sacroiliac joints, and symphysis pubis are preserved. The pubic rami and iliac wings are unremarkable. No discernible acute osseous abnormality. Recommendation: Follow up as clinically indicated. Electronically Signed by Neymar Ward MD at 27-Oct-2022 09:54:54 AM EST             CT HEAD WO CONTRAST    Result Date: 10/27/2022  NO PRIOR REPORT AVAILABLE Exam: CT OF THE BRAIN WITHOUT CONTRAST Clinical data: Multiple falls.  Technique: Contiguous axial images are obtained from the skull base to vertex without intravenous contrast. Reformatted/MPR images were performed. Radiation dose: CTDIvol =94.63 mGy, DLP =1998 mGy x cm. Prior studies: No prior studies submitted. Findings: No acute intracranial abnormality is present. No evidence of acute cortical infarction, hemorrhage, mass or mass effect. Mild atrophic changes. No hydrocephalus or abnormal extra-axial fluid collections are present. The posterior fossa is unremarkable. The skull base and calvarium are intact. The included portions of the paranasal sinuses and mastoid air cells are clear. 1.  Mild atrophic changes. 2.  Otherwise no acute intracranial abnormalities. Recommendation: Follow up as clinically indicated. All CT scans at this facility utilize dose modulation, iterative reconstruction, and/or weight based dosing when appropriate to reduce radiation dose to as low as reasonably achievable. Electronically Signed by Hussein Brandon MD at 27-Oct-2022 09:15:26 AM EST             CT CERVICAL SPINE WO CONTRAST    Result Date: 10/27/2022  NO PRIOR REPORT AVAILABLE Exam: CT OF THE CERVICAL SPINE WITHOUT CONTRAST Clinical data: Multiple falls. Hit head. EtOH. Technique: Contiguous axial imaging of the cervical spine. Reconstructed imaging in the coronal and sagittal planes. Reformatted/MPR images were performed. Radiation dose: CTDIvol =94.63 mGy, DLP =1998 mGy x cm. Priorstudies: No prior studies submitted. Findings: There isgrossly unremarkablealignment without acute fracture or subluxation. Bone mineralization is grossly unremarkable. Vertebral body heights are maintained. Posterior elements are intact. Straightening of the physiologic cervical lordosis. Scattered vascular calcifications. Multilevel facet arthropathic changes; right greater than left. Moderate disc space narrowing with a 3.5 mm posterior bulging disc at C5-C6. No CT evidence of bony spinal canal or neural foramen stenosis. Skull base and craniocervical junction are intact.  Lung apices are clear.    1.  No acute osseous abnormality. 2.  Degenerative changes as above. Recommendation: Follow up as clinically indicated. All CT scans at this facility utilize dose modulation, iterative reconstruction, and/or weight based dosing when appropriate to reduce radiation dose to as low as reasonably achievable. Electronically Signed by Neymar Ward MD at 27-Oct-2022 09:18:45 AM EST             XR CHEST PORTABLE    Result Date: 10/27/2022  NO PRIOR REPORT AVAILABLE [facility] Clinical data:Chest pain. Technique:Single view of the chest. Prior studies: No prior studies submitted. Findings: The trachea is midline. The cardiac, mediastinal and pulmonary vascular shadows are normal.  There is no focal consolidation or effusion. The osseous structures are intact. No active pulmonary disease. Recommendation: Follow up as clinically indicated. Electronically Signed by Rossi Bae MD at 27-Oct-2022 09:42:49 AM EST               Assessment/Plan:  Principal Problem:    Alcohol intoxication in active alcoholic without complication ([facility] [address] Alejandro Letters of withdrawal seizure   -Admit   -Seizure precautions   -Fall precautions   -Thiamine and folic acid supplementation   -Social work to see for consideration of rehab   -Monitor CBC, BMP, mag, Phos   -Diet as tolerated   -CIWA protocol as EtOH declines  Resolved Problems:    * No resolved hospital problems.  *       Signed:  TIBURCIO Troy - CNP, 10/27/2022 1:51 PM

## 2022-10-27 NOTE — ED NOTES
Pt given breakfast, pt requesting sleeping pill. Per Dr Adonay Field, no sleeping pill. Pt given a warm blanket, lets turned off.  Pt on cardiac monitor, no complaints     Dangelo Freed, [facility]  10/27/22 5506

## 2022-10-27 NOTE — CARE COORDINATION
10/27/22 0847   Service Assessment   Patient Orientation Alert and Oriented   Cognition Alert   History Provided By Patient   Primary Caregiver Self   Accompanied By/Relationship no one at bedside   [address] is:   (states girlfriend - Vivian Romo)   PCP Verified by CM Yes   Last Visit to PCP Within last 3 months   Prior Functional Level Independent in ADLs/IADLs   Current Functional Level Independent in ADLs/IADLs   Can patient return to prior living arrangement Yes   Ability to make needs known: Good   Family able to assist with home care needs: Yes   Would you like for me to discuss the discharge plan with any other family members/significant others, and if so, who? No   Financial Resources Jeremy Stanton   (denied needs)   CM/SW Referral   (ETOH rehab)   Social/Functional History   Lives With Alone   Type of Home House   Bathroom Toilet Standard   Bathroom Equipment   (denied use/need of bathroom dme)   [address]   (denied use/need of home dme)   [address] [facility] Responsibilities Yes   Ambulation Assistance Independent   Transfer Assistance Independent   Active  Yes   Mode of Transportation Car   Type of Occupation pt states \"I'm in a waiting period. filed for diability and waiting to hear back. also applied for jobs and waiting on job offers\"   Discharge Planning   Type of [address] Prior To Admission None   Potential Assistance Needed   (ETOH rehab)   DME Ordered?  No   Potential Assistance Purchasing Medications No   Type of Home Care Services None   Patient expects to be discharged to: [address] Discharge   Mode of Transport at Discharge   (Friend or family)   Confirm Follow Up Transport Family

## 2022-10-27 NOTE — CARE COORDINATION
Patient Contact Information:    [address] [place]   [phone] (home)   No relevant phone numbers on file. Above information verified? [x]   Yes  []   No      Emergency Contacts:    Extended Emergency Contact Information  Primary Emergency Contact: [address]  [facility] Phone: [phone]  Relation: Parent   needed? No      Have you been vaccinated for COVID-19 (SARS-CoV-2)? [x]   Yes  []   No                   If so, when? Which :         []   Pfizer-BioNTech  [x]   Moderna  []   Lanie Rushmore  []   Other:         Pharmacy:    [facility] [address]  Phone: [phone] Fax: [phone]          Patient Deficits:    []   Yes   [x]   No    If yes:    []   Confusion/Memory  []   Visual  []   Motor/Sensory         []   Right arm         []   Right leg         []   Left arm         []   Left leg  []   Language/Speech         []   Aphasia         []   Dysarthria         []   Swallow         Linda Coma Scale  Eye Opening: Spontaneous  Best Verbal Response: Oriented  Best Motor Response: Obeys commands  Linda Coma Scale Score: 15    Patient Deficit Notes:         SW met with pt at bedside. Pt states he lives alone. States he has had multiple falls. When asked if this was due to intoxication, pt states \"it's because I have sweaty feet and wear flip flops\" denied need for any DME. Interested in etoh rehab. States he has been to rehab in the past at [facility]. He states this was not court ordered, he volunteered to go. States he would like to dc to rehab in [place]. Informed pt and attending of [facility] as they have a detox program. Attending does not feel safe discharging pt from the ED to [facility] at time due to recent falls and depression.

## 2022-10-28 PROBLEM — F17.200 NICOTINE DEPENDENCE: Status: ACTIVE | Noted: 2022-10-28

## 2022-10-28 LAB
ALBUMIN SERPL-MCNC: 3.3 G/DL (ref 3.5–5.2)
ALP BLD-CCNC: 154 U/L (ref 40–130)
ALT SERPL-CCNC: 30 U/L (ref 5–41)
ANION GAP SERPL CALCULATED.3IONS-SCNC: 13 MMOL/L (ref 7–19)
AST SERPL-CCNC: 72 U/L (ref 5–40)
BASOPHILS ABSOLUTE: 0 K/UL (ref 0–0.2)
BASOPHILS RELATIVE PERCENT: 0.7 % (ref 0–1)
BILIRUB SERPL-MCNC: 0.5 MG/DL (ref 0.2–1.2)
BUN BLDV-MCNC: 5 MG/DL (ref 6–20)
CALCIUM SERPL-MCNC: 8.5 MG/DL (ref 8.6–10)
CHLORIDE BLD-SCNC: 97 MMOL/L (ref 98–111)
CO2: 28 MMOL/L (ref 22–29)
CREAT SERPL-MCNC: 0.6 MG/DL (ref 0.5–1.2)
EOSINOPHILS ABSOLUTE: 0 K/UL (ref 0–0.6)
EOSINOPHILS RELATIVE PERCENT: 0.7 % (ref 0–5)
ETHANOL: <10 MG/DL (ref 0–0.08)
GFR SERPL CREATININE-BSD FRML MDRD: >60 ML/MIN/{1.73_M2}
GLUCOSE BLD-MCNC: 87 MG/DL (ref 74–109)
HBA1C MFR BLD: 5.5 % (ref 4–6)
HCT VFR BLD CALC: 34.1 % (ref 42–52)
HEMOGLOBIN: 12 G/DL (ref 14–18)
IMMATURE GRANULOCYTES #: 0 K/UL
LYMPHOCYTES ABSOLUTE: 0.8 K/UL (ref 1.1–4.5)
LYMPHOCYTES RELATIVE PERCENT: 20.1 % (ref 20–40)
MAGNESIUM: 1.2 MG/DL (ref 1.6–2.6)
MCH RBC QN AUTO: 35.7 PG (ref 27–31)
MCHC RBC AUTO-ENTMCNC: 35.2 G/DL (ref 33–37)
MCV RBC AUTO: 101.5 FL (ref 80–94)
MONOCYTES ABSOLUTE: 0.4 K/UL (ref 0–0.9)
MONOCYTES RELATIVE PERCENT: 9.4 % (ref 0–10)
NEUTROPHILS ABSOLUTE: 2.8 K/UL (ref 1.5–7.5)
NEUTROPHILS RELATIVE PERCENT: 68.4 % (ref 50–65)
PDW BLD-RTO: 12.6 % (ref 11.5–14.5)
PHOSPHORUS: 3.3 MG/DL (ref 2.5–4.5)
PLATELET # BLD: 105 K/UL (ref 130–400)
PMV BLD AUTO: 10.6 FL (ref 9.4–12.4)
POTASSIUM SERPL-SCNC: 3 MMOL/L (ref 3.5–5)
RBC # BLD: 3.36 M/UL (ref 4.7–6.1)
SODIUM BLD-SCNC: 138 MMOL/L (ref 136–145)
TOTAL PROTEIN: 5.9 G/DL (ref 6.6–8.7)
TSH SERPL DL<=0.05 MIU/L-ACNC: 3.08 UIU/ML (ref 0.27–4.2)
VITAMIN B-12: 514 PG/ML (ref 211–946)
WBC # BLD: 4.1 K/UL (ref 4.8–10.8)

## 2022-10-28 PROCEDURE — 83036 HEMOGLOBIN GLYCOSYLATED A1C: CPT

## 2022-10-28 PROCEDURE — 6370000000 HC RX 637 (ALT 250 FOR IP): Performed by: NURSE PRACTITIONER

## 2022-10-28 PROCEDURE — 85025 COMPLETE CBC W/AUTO DIFF WBC: CPT

## 2022-10-28 PROCEDURE — 83735 ASSAY OF MAGNESIUM: CPT

## 2022-10-28 PROCEDURE — 84100 ASSAY OF PHOSPHORUS: CPT

## 2022-10-28 PROCEDURE — 2580000003 HC RX 258: Performed by: NURSE PRACTITIONER

## 2022-10-28 PROCEDURE — 6370000000 HC RX 637 (ALT 250 FOR IP): Performed by: EMERGENCY MEDICINE

## 2022-10-28 PROCEDURE — 36415 COLL VENOUS BLD VENIPUNCTURE: CPT

## 2022-10-28 PROCEDURE — 6360000002 HC RX W HCPCS: Performed by: STUDENT IN AN ORGANIZED HEALTH CARE EDUCATION/TRAINING PROGRAM

## 2022-10-28 PROCEDURE — 1210000000 HC MED SURG R&B

## 2022-10-28 PROCEDURE — 82077 ASSAY SPEC XCP UR&BREATH IA: CPT

## 2022-10-28 PROCEDURE — 84443 ASSAY THYROID STIM HORMONE: CPT

## 2022-10-28 PROCEDURE — 6360000002 HC RX W HCPCS: Performed by: NURSE PRACTITIONER

## 2022-10-28 PROCEDURE — 80053 COMPREHEN METABOLIC PANEL: CPT

## 2022-10-28 PROCEDURE — 82607 VITAMIN B-12: CPT

## 2022-10-28 RX ORDER — PHENOBARBITAL SODIUM 130 MG/ML
260 INJECTION INTRAMUSCULAR ONCE
Status: COMPLETED | OUTPATIENT
Start: 2022-10-28 | End: 2022-10-28

## 2022-10-28 RX ORDER — PHENOBARBITAL SODIUM 130 MG/ML
260 INJECTION INTRAMUSCULAR ONCE
Status: DISCONTINUED | OUTPATIENT
Start: 2022-10-28 | End: 2022-10-28 | Stop reason: SDUPTHER

## 2022-10-28 RX ADMIN — LORAZEPAM 2 MG: 2 TABLET ORAL at 22:53

## 2022-10-28 RX ADMIN — LORAZEPAM 2 MG: 2 TABLET ORAL at 20:22

## 2022-10-28 RX ADMIN — LORAZEPAM 2 MG: 2 INJECTION INTRAMUSCULAR; INTRAVENOUS at 06:53

## 2022-10-28 RX ADMIN — LORAZEPAM 2 MG: 2 INJECTION INTRAMUSCULAR; INTRAVENOUS at 18:34

## 2022-10-28 RX ADMIN — LORAZEPAM 3 MG: 1 TABLET ORAL at 11:07

## 2022-10-28 RX ADMIN — LORAZEPAM 2 MG: 2 INJECTION INTRAMUSCULAR; INTRAVENOUS at 15:55

## 2022-10-28 RX ADMIN — LORAZEPAM 2 MG: 2 TABLET ORAL at 03:11

## 2022-10-28 RX ADMIN — THERA TABS 1 TABLET: TAB at 11:07

## 2022-10-28 RX ADMIN — PHENOBARBITAL SODIUM 260 MG: 130 INJECTION INTRAMUSCULAR; INTRAVENOUS at 12:15

## 2022-10-28 RX ADMIN — Medication 10 ML: at 11:10

## 2022-10-28 RX ADMIN — ENOXAPARIN SODIUM 30 MG: 100 INJECTION SUBCUTANEOUS at 11:08

## 2022-10-28 RX ADMIN — THIAMINE HCL TAB 100 MG 100 MG: 100 TAB at 11:07

## 2022-10-28 RX ADMIN — ENOXAPARIN SODIUM 30 MG: 100 INJECTION SUBCUTANEOUS at 20:22

## 2022-10-28 ASSESSMENT — ENCOUNTER SYMPTOMS
DIARRHEA: 0
ABDOMINAL PAIN: 0
SHORTNESS OF BREATH: 0
COUGH: 0

## 2022-10-28 ASSESSMENT — PAIN SCALES - GENERAL: PAINLEVEL_OUTOF10: 0

## 2022-10-28 NOTE — PLAN OF CARE
Problem: Discharge Planning  Goal: Discharge to home or other facility with appropriate resources  10/28/2022 0344 by Demetrius Sheppard LPN  Outcome: Laura Guzman (Taken 10/27/2022 2148)  Discharge to home or other facility with appropriate resources: Identify barriers to discharge with patient and caregiver  10/27/2022 1848 by Ángela Guerra RN  Outcome: Progressing     Problem: Pain  Goal: Verbalizes/displays adequate comfort level or baseline comfort level  10/28/2022 0344 by Demetrius Sheppard LPN  Outcome: Progressing  10/27/2022 1848 by Ángela Guerra RN  Outcome: Progressing     Problem: Safety - Adult  Goal: Free from fall injury  10/28/2022 0344 by Demetrius Sheppard LPN  Outcome: Progressing  Flowsheets (Taken 10/28/2022 0341)  Free From Fall Injury: Instruct family/caregiver on patient safety  10/27/2022 1848 by Ángela Guerra RN  Outcome: Progressing     Problem: Skin/Tissue Integrity  Goal: Absence of new skin breakdown  Description: 1. Monitor for areas of redness and/or skin breakdown  2. Assess vascular access sites hourly  3. Every 4-6 hours minimum:  Change oxygen saturation probe site  4. Every 4-6 hours:  If on nasal continuous positive airway pressure, respiratory therapy assess nares and determine need for appliance change or resting period.   10/28/2022 0344 by Demetrius Sheppard LPN  Outcome: Progressing  10/27/2022 1848 by Ángela Guerra RN  Outcome: Progressing     Problem: Nutrition Deficit:  Goal: Optimize nutritional status  10/28/2022 0344 by Demetrius Sheppard LPN  Outcome: Progressing  10/27/2022 1848 by Ángela Guerra RN  Outcome: Progressing

## 2022-10-28 NOTE — PROGRESS NOTES
Daily Progress Note    Date:10/28/2022  Patient: Shanika Morel  : 1975  NZO:[id number]  CODE:Full Code No additional code details  PCP:Jayant Stanton    Admit Date: 10/27/2022  5:55 AM   LOS: 1 day     Chief Complaint   Patient presents with    Alcohol Intoxication     Drank 2 pints of whiskey tonight, wants to go to rehab         Subjective: Pt received multiple doses Ativan for EtOH withdrawal with most recent CIWA 12. Pt feels anxious and tremulous. He is interested in inpatient alcohol rehab. Previously was drinking 2 pints and 2-3 beers daily. Hospital Summary:The patient is a 49 y.o. male with past medical history of anxiety and seizures who presented to [facility] ED complaining of alcohol intoxication. Patient reported to the ED indicating he had been drinking heavily recently. Patient states he would like rehab. Patient also indicated having multiple falls over the past few weeks. Patient denied any injury. On examination in the ED patient was noted to have multiple bruises scattered throughout his body in multiple stages of healing. Patient indicated depression but denied SI or HI. Patient denies hallucinations or delusions. Chart review indicates patient has history of seizures with alcohol withdrawal in the past. Admitted to hospitalist service for EtOH withdrawal and started on CIWA protocol. Given 260 mg Phenobarbital IV on 10/28. Plan is to go to inpatient rehab at [facility] after discharge. Review of Systems   Constitutional:  Negative for chills and fever. Respiratory:  Negative for cough and shortness of breath. Cardiovascular:  Negative for chest pain and palpitations. Gastrointestinal:  Negative for abdominal pain and diarrhea.      Objective:      Vital signs in last 24 hours:  Patient Vitals for the past 24 hrs:   BP Temp Temp src Pulse Resp SpO2   10/28/22 0637 (!) 139/90 97.2 °F (36.2 °C) -- 68 18 94 %   10/28/22 0058 135/86 98.4 °F (36.9 °C) Oral 81 20 95 % 10/27/22 1717 (!) 141/97 97.9 °F (36.6 °C) Temporal 76 18 93 %       I/O last 3 completed shifts:  In: -   Out: 1400 [Urine:1400]  No intake/output data recorded. Physical Exam  Constitutional:       Appearance: He is diaphoretic. He is not toxic-appearing. Comments: Appears anxious and tremulous   Cardiovascular:      Rate and Rhythm: Normal rate and regular rhythm. Pulses: Normal pulses. Heart sounds: Normal heart sounds. Pulmonary:      Effort: Pulmonary effort is normal. No respiratory distress. Breath sounds: Normal breath sounds. Abdominal:      General: Abdomen is flat. There is no distension. Palpations: Abdomen is soft. Tenderness: There is no abdominal tenderness.            Lab Review   Recent Results (from the past 24 hour(s))   Ethanol    Collection Time: 10/27/22  4:46 PM   Result Value Ref Range    Ethanol Lvl 56 mg/dL   Magnesium    Collection Time: 10/28/22  3:00 AM   Result Value Ref Range    Magnesium 1.2 (L) 1.6 - 2.6 mg/dL   Comprehensive Metabolic Panel    Collection Time: 10/28/22  3:00 AM   Result Value Ref Range    Sodium 138 136 - 145 mmol/L    Potassium 3.0 (L) 3.5 - 5.0 mmol/L    Chloride 97 (L) 98 - 111 mmol/L    CO2 28 22 - 29 mmol/L    Anion Gap 13 7 - 19 mmol/L    Glucose 87 74 - 109 mg/dL    BUN 5 (L) 6 - 20 mg/dL    Creatinine 0.6 0.5 - 1.2 mg/dL    Est, Glom Filt Rate >60 >60    Calcium 8.5 (L) 8.6 - 10.0 mg/dL    Total Protein 5.9 (L) 6.6 - 8.7 g/dL    Albumin 3.3 (L) 3.5 - 5.2 g/dL    Total Bilirubin 0.5 0.2 - 1.2 mg/dL    Alkaline Phosphatase 154 (H) 40 - 130 U/L    ALT 30 5 - 41 U/L    AST 72 (H) 5 - 40 U/L   CBC with Auto Differential    Collection Time: 10/28/22  3:00 AM   Result Value Ref Range    WBC 4.1 (L) 4.8 - 10.8 K/uL    RBC 3.36 (L) 4.70 - 6.10 M/uL    Hemoglobin 12.0 (L) 14.0 - 18.0 g/dL    Hematocrit 34.1 (L) 42.0 - 52.0 %    .5 (H) 80.0 - 94.0 fL    MCH 35.7 (H) 27.0 - 31.0 pg    MCHC 35.2 33.0 - 37.0 g/dL    RDW 12.6 11.5 - 14.5 %    Platelets 647 (L) 736 - 400 K/uL    MPV 10.6 9.4 - 12.4 fL    Neutrophils % 68.4 (H) 50.0 - 65.0 %    Lymphocytes % 20.1 20.0 - 40.0 %    Monocytes % 9.4 0.0 - 10.0 %    Eosinophils % 0.7 0.0 - 5.0 %    Basophils % 0.7 0.0 - 1.0 %    Neutrophils Absolute 2.8 1.5 - 7.5 K/uL    Immature Granulocytes # 0.0 K/uL    Lymphocytes Absolute 0.8 (L) 1.1 - 4.5 K/uL    Monocytes Absolute 0.40 0.00 - 0.90 K/uL    Eosinophils Absolute 0.00 0.00 - 0.60 K/uL    Basophils Absolute 0.00 0.00 - 0.20 K/uL   Phosphorus    Collection Time: 10/28/22  3:00 AM   Result Value Ref Range    Phosphorus 3.3 2.5 - 4.5 mg/dL   Vitamin B12    Collection Time: 10/28/22  3:00 AM   Result Value Ref Range    Vitamin B-12 514 211 - 946 pg/mL   TSH without Reflex    Collection Time: 10/28/22  3:00 AM   Result Value Ref Range    TSH 3.080 0.270 - 4.200 uIU/mL   Hemoglobin A1C    Collection Time: 10/28/22  3:00 AM   Result Value Ref Range    Hemoglobin A1C 5.5 4.0 - 6.0 %   Ethanol    Collection Time: 10/28/22  3:00 AM   Result Value Ref Range    Ethanol Lvl <10 mg/dL             Current Facility-Administered Medications:     thiamine mononitrate tablet 100 mg, 100 mg, Oral, Daily, Sheldon Faith MD, 100 mg at 10/28/22 1107    sodium chloride flush 0.9 % injection 5-40 mL, 5-40 mL, IntraVENous, 2 times per day, TIBURCIO Dee CNP, 10 mL at 10/28/22 1110    sodium chloride flush 0.9 % injection 5-40 mL, 5-40 mL, IntraVENous, PRN, TIBURCIO Bhatt CNP    0.9 % sodium chloride infusion, , IntraVENous, PRN, TIBURCIO Dee CNP    enoxaparin Sodium (LOVENOX) injection 30 mg, 30 mg, SubCUTAneous, BID, TIBURCIO Bhatt CNP, 30 mg at 10/28/22 1108    ondansetron (ZOFRAN-ODT) disintegrating tablet 4 mg, 4 mg, Oral, Q8H PRN **OR** ondansetron (ZOFRAN) injection 4 mg, 4 mg, IntraVENous, Q6H PRN, TIBURCIO Bhatt CNP    polyethylene glycol (GLYCOLAX) packet 17 g, 17 g, Oral, Daily PRN, Bear[place] Ground, APRN - CNP    acetaminophen (TYLENOL) tablet 650 mg, 650 mg, Oral, Q6H PRN, 650 mg at 10/27/22 1720 **OR** acetaminophen (TYLENOL) suppository 650 mg, 650 mg, Rectal, Q6H PRN, Bearl [place], APRN - CNP    lactated ringers infusion, , IntraVENous, Continuous, Bearl Ground, APRN - CNP, Last Rate: 100 mL/hr at 10/27/22 1938, New Bag at 10/27/22 1938    multivitamin 1 tablet, 1 tablet, Oral, Daily, Bear [place], APRN - CNP, 1 tablet at 10/28/22 1107    potassium chloride (KLOR-CON M) extended release tablet 40 mEq, 40 mEq, Oral, PRN **OR** potassium bicarb-citric acid (EFFER-K) effervescent tablet 40 mEq, 40 mEq, Oral, PRN **OR** potassium chloride 10 mEq/100 mL IVPB (Peripheral Line), 10 mEq, IntraVENous, PRN, Bearl Ground, APRN - CNP    magnesium sulfate 2000 mg in 50 mL IVPB premix, 2,000 mg, IntraVENous, PRN, Bearl Ground, APRN - CNP    nicotine (NICODERM CQ) 21 MG/24HR 1 patch, 1 patch, TransDERmal, Daily, Bearl Ground, APRN - CNP, 1 patch at 10/28/22 1108    LORazepam (ATIVAN) tablet 1 mg, 1 mg, Oral, Q1H PRN **OR** LORazepam (ATIVAN) injection 1 mg, 1 mg, IntraVENous, Q1H PRN **OR** LORazepam (ATIVAN) tablet 2 mg, 2 mg, Oral, Q1H PRN, 2 mg at 10/28/22 0311 **OR** LORazepam (ATIVAN) injection 2 mg, 2 mg, IntraVENous, Q1H PRN, 2 mg at 10/28/22 0653 **OR** LORazepam (ATIVAN) tablet 3 mg, 3 mg, Oral, Q1H PRN, 3 mg at 10/28/22 1107 **OR** LORazepam (ATIVAN) injection 3 mg, 3 mg, IntraVENous, Q1H PRN, 3 mg at 10/27/22 2343 **OR** LORazepam (ATIVAN) tablet 4 mg, 4 mg, Oral, Q1H PRN **OR** LORazepam (ATIVAN) injection 4 mg, 4 mg, IntraVENous, Q1H PRN, TIBURCIO Bhatt - CNP    sodium chloride (OCEAN, BABY AYR) 0.65 % nasal spray 1 spray, 1 spray, Each Nostril, Q4H PRN, Bearl Ground, APRN - CNP      Imaging:  XR PELVIS (1-2 VIEWS)    Result Date: 10/27/2022  No discernible acute osseous abnormality. Recommendation: Follow up as clinically indicated. Electronically Signed by Raphael Singh MD at 27-Oct-2022 09:54:54 AM EST             CT HEAD WO CONTRAST    Result Date: 10/27/2022  1. Mild atrophic changes. 2.  Otherwise no acute intracranial abnormalities. Recommendation: Follow up as clinically indicated. All CT scans at this facility utilize dose modulation, iterative reconstruction, and/or weight based dosing when appropriate to reduce radiation dose to as low as reasonably achievable. Electronically Signed by Nirav Morales MD at 27-Oct-2022 09:15:26 AM EST             CT CERVICAL SPINE WO CONTRAST    Result Date: 10/27/2022  1. No acute osseous abnormality. 2.  Degenerative changes as above. Recommendation: Follow up as clinically indicated. All CT scans at this facility utilize dose modulation, iterative reconstruction, and/or weight based dosing when appropriate to reduce radiation dose to as low as reasonably achievable. Electronically Signed by Raphael Singh MD at 27-Oct-2022 09:18:45 AM EST             XR CHEST PORTABLE    Result Date: 10/27/2022  No active pulmonary disease. Recommendation: Follow up as clinically indicated. Electronically Signed by Devang Talavera MD at 27-Oct-2022 09:42:49 AM EST                  Assessment/Plan  Principal Problem:    Alcohol intoxication in active alcoholic without complication ([address]  Active Problems:    Nicotine dependence  Resolved Problems:    * No resolved hospital problems. *     -- Continue [facility] protocol  -- Give 260 mg Phenobarbital x1 dose  -- Can repeat phenobarb 130 mg if needed  -- PO thiamine  -- Inpatient alcohol rehab after discharge  -- Nicotine patch      DVT prophylaxis: Lovenox    DISPOSITION:  Home and then inpatient alcohol rehabilitation, likely [facility] 10/30.     Full Code No additional code details        Janie Cottrell MD 10/28/2022 2:56 PM

## 2022-10-28 NOTE — PROGRESS NOTES
4 Eyes Skin Assessment    [facility] is being assessed upon: Admission    I agree that I, Jaci Jose RN, along with Alejandro Astudillo LPN (either 2 RN's or 1 LPN and 1 RN) have performed a thorough Head to Toe Skin Assessment on the patient. ALL assessment sites listed below have been assessed. Areas assessed by both nurses:     [x]   Head, Face, and Ears   [x]   Shoulders, Back, and Chest  [x]   Arms, Elbows, and Hands   [x]   Coccyx, Sacrum, and Ischium  [x]   Legs, Feet, and Heels    Does the Patient have Skin Breakdown?  No    Abe Prevention initiated: Yes  Wound Care Orders initiated: No    [facility] nurse consulted for Pressure Injury (Stage 3,4, Unstageable, DTI, NWPT, and Complex wounds) and New or Established Ostomies: No        Primary Nurse eSignature: Jaci Jose RN on 10/27/2022 at 7:27 PM      Co-Signer eSignature: Electronically signed by Gerri Mcintyre LPN on 46/03/12 at 5:29 PM CDT

## 2022-10-28 NOTE — CARE COORDINATION
MALINDA placed call to [facility] per [employer] request as he was scheduled to admit to their recovery program recently; but then Pt admitted to the hospital; [phone]; option 1; spoke with Timmy Cain; she located Pt's info; and asked for a referral with demographics, H&P, progress notes, and MAR faxed to [phone]; she noted it will be reviewed and they will call back re: screening call etc., MALINDA also gave MALINDA-na[facility] ED phone number for the weekend.     Electronically signed by JULIET Tan on 10/28/2022 at 4:34 PM

## 2022-10-29 LAB
ALBUMIN SERPL-MCNC: 3.4 G/DL (ref 3.5–5.2)
ALP BLD-CCNC: 155 U/L (ref 40–130)
ALT SERPL-CCNC: 27 U/L (ref 5–41)
ANION GAP SERPL CALCULATED.3IONS-SCNC: 14 MMOL/L (ref 7–19)
AST SERPL-CCNC: 57 U/L (ref 5–40)
BASOPHILS ABSOLUTE: 0 K/UL (ref 0–0.2)
BASOPHILS RELATIVE PERCENT: 0.7 % (ref 0–1)
BILIRUB SERPL-MCNC: 0.4 MG/DL (ref 0.2–1.2)
BUN BLDV-MCNC: 5 MG/DL (ref 6–20)
CALCIUM SERPL-MCNC: 9.1 MG/DL (ref 8.6–10)
CHLORIDE BLD-SCNC: 95 MMOL/L (ref 98–111)
CO2: 27 MMOL/L (ref 22–29)
CREAT SERPL-MCNC: 0.6 MG/DL (ref 0.5–1.2)
EOSINOPHILS ABSOLUTE: 0.1 K/UL (ref 0–0.6)
EOSINOPHILS RELATIVE PERCENT: 2 % (ref 0–5)
GFR SERPL CREATININE-BSD FRML MDRD: >60 ML/MIN/{1.73_M2}
GLUCOSE BLD-MCNC: 85 MG/DL (ref 74–109)
HCT VFR BLD CALC: 35.9 % (ref 42–52)
HEMOGLOBIN: 12.7 G/DL (ref 14–18)
IMMATURE GRANULOCYTES #: 0 K/UL
LYMPHOCYTES ABSOLUTE: 0.9 K/UL (ref 1.1–4.5)
LYMPHOCYTES RELATIVE PERCENT: 23.1 % (ref 20–40)
MAGNESIUM: 1.5 MG/DL (ref 1.6–2.6)
MCH RBC QN AUTO: 35.2 PG (ref 27–31)
MCHC RBC AUTO-ENTMCNC: 35.4 G/DL (ref 33–37)
MCV RBC AUTO: 99.4 FL (ref 80–94)
MONOCYTES ABSOLUTE: 0.5 K/UL (ref 0–0.9)
MONOCYTES RELATIVE PERCENT: 12.8 % (ref 0–10)
NEUTROPHILS ABSOLUTE: 2.5 K/UL (ref 1.5–7.5)
NEUTROPHILS RELATIVE PERCENT: 60.9 % (ref 50–65)
PDW BLD-RTO: 12.2 % (ref 11.5–14.5)
PLATELET # BLD: 117 K/UL (ref 130–400)
PMV BLD AUTO: 10.3 FL (ref 9.4–12.4)
POTASSIUM SERPL-SCNC: 3.1 MMOL/L (ref 3.5–5)
RBC # BLD: 3.61 M/UL (ref 4.7–6.1)
SODIUM BLD-SCNC: 136 MMOL/L (ref 136–145)
TOTAL PROTEIN: 6.4 G/DL (ref 6.6–8.7)
WBC # BLD: 4.1 K/UL (ref 4.8–10.8)

## 2022-10-29 PROCEDURE — 6360000002 HC RX W HCPCS: Performed by: NURSE PRACTITIONER

## 2022-10-29 PROCEDURE — 36415 COLL VENOUS BLD VENIPUNCTURE: CPT

## 2022-10-29 PROCEDURE — 6370000000 HC RX 637 (ALT 250 FOR IP): Performed by: STUDENT IN AN ORGANIZED HEALTH CARE EDUCATION/TRAINING PROGRAM

## 2022-10-29 PROCEDURE — 83735 ASSAY OF MAGNESIUM: CPT

## 2022-10-29 PROCEDURE — 1210000000 HC MED SURG R&B

## 2022-10-29 PROCEDURE — 6370000000 HC RX 637 (ALT 250 FOR IP): Performed by: EMERGENCY MEDICINE

## 2022-10-29 PROCEDURE — 2580000003 HC RX 258: Performed by: NURSE PRACTITIONER

## 2022-10-29 PROCEDURE — 6370000000 HC RX 637 (ALT 250 FOR IP): Performed by: NURSE PRACTITIONER

## 2022-10-29 PROCEDURE — 80053 COMPREHEN METABOLIC PANEL: CPT

## 2022-10-29 PROCEDURE — 85025 COMPLETE CBC W/AUTO DIFF WBC: CPT

## 2022-10-29 PROCEDURE — 6360000002 HC RX W HCPCS: Performed by: STUDENT IN AN ORGANIZED HEALTH CARE EDUCATION/TRAINING PROGRAM

## 2022-10-29 RX ORDER — MAGNESIUM SULFATE IN WATER 40 MG/ML
4000 INJECTION, SOLUTION INTRAVENOUS ONCE
Status: DISCONTINUED | OUTPATIENT
Start: 2022-10-29 | End: 2022-11-01 | Stop reason: HOSPADM

## 2022-10-29 RX ORDER — PHENOBARBITAL SODIUM 130 MG/ML
130 INJECTION INTRAMUSCULAR ONCE
Status: COMPLETED | OUTPATIENT
Start: 2022-10-29 | End: 2022-10-29

## 2022-10-29 RX ORDER — POTASSIUM CHLORIDE 20 MEQ/1
40 TABLET, EXTENDED RELEASE ORAL ONCE
Status: COMPLETED | OUTPATIENT
Start: 2022-10-29 | End: 2022-10-29

## 2022-10-29 RX ADMIN — LORAZEPAM 2 MG: 2 INJECTION INTRAMUSCULAR; INTRAVENOUS at 16:24

## 2022-10-29 RX ADMIN — ENOXAPARIN SODIUM 30 MG: 100 INJECTION SUBCUTANEOUS at 20:03

## 2022-10-29 RX ADMIN — LORAZEPAM 2 MG: 2 INJECTION INTRAMUSCULAR; INTRAVENOUS at 20:03

## 2022-10-29 RX ADMIN — LORAZEPAM 3 MG: 1 TABLET ORAL at 09:22

## 2022-10-29 RX ADMIN — LORAZEPAM 2 MG: 2 TABLET ORAL at 03:56

## 2022-10-29 RX ADMIN — LORAZEPAM 1 MG: 1 TABLET ORAL at 03:57

## 2022-10-29 RX ADMIN — THERA TABS 1 TABLET: TAB at 09:22

## 2022-10-29 RX ADMIN — THIAMINE HCL TAB 100 MG 100 MG: 100 TAB at 09:22

## 2022-10-29 RX ADMIN — Medication 10 ML: at 20:25

## 2022-10-29 RX ADMIN — LORAZEPAM 1 MG: 1 TABLET ORAL at 00:46

## 2022-10-29 RX ADMIN — LORAZEPAM 4 MG: 2 INJECTION, SOLUTION INTRAMUSCULAR; INTRAVENOUS at 13:22

## 2022-10-29 RX ADMIN — POTASSIUM CHLORIDE 40 MEQ: 1500 TABLET, EXTENDED RELEASE ORAL at 09:42

## 2022-10-29 RX ADMIN — LORAZEPAM 2 MG: 2 TABLET ORAL at 00:45

## 2022-10-29 RX ADMIN — PHENOBARBITAL SODIUM 130 MG: 130 INJECTION INTRAMUSCULAR; INTRAVENOUS at 13:21

## 2022-10-29 RX ADMIN — ENOXAPARIN SODIUM 30 MG: 100 INJECTION SUBCUTANEOUS at 09:22

## 2022-10-29 ASSESSMENT — ENCOUNTER SYMPTOMS
DIARRHEA: 0
SHORTNESS OF BREATH: 0
ABDOMINAL PAIN: 0
COUGH: 0

## 2022-10-29 ASSESSMENT — PAIN SCALES - GENERAL: PAINLEVEL_OUTOF10: 0

## 2022-10-29 NOTE — CARE COORDINATION
Maylin met with pt at bedside. Pt had just woke up and was a little slow to respond. Maylin informed pt about needing to contact [facility]. Pt stated that he needed to wake up a little better before he made the call. Maylin left the contact number next to pt's phone and glasses.

## 2022-10-29 NOTE — PLAN OF CARE
Problem: Discharge Planning  Goal: Discharge to home or other facility with appropriate resources  Outcome: Progressing  Flowsheets (Taken 10/28/2022 2027)  Discharge to home or other facility with appropriate resources: Identify barriers to discharge with patient and caregiver     Problem: Pain  Goal: Verbalizes/displays adequate comfort level or baseline comfort level  Outcome: Progressing     Problem: Safety - Adult  Goal: Free from fall injury  Outcome: Progressing  Flowsheets (Taken 10/29/2022 0117)  Free From Fall Injury: Instruct family/caregiver on patient safety     Problem: Skin/Tissue Integrity  Goal: Absence of new skin breakdown  Description: 1. Monitor for areas of redness and/or skin breakdown  2. Assess vascular access sites hourly  3. Every 4-6 hours minimum:  Change oxygen saturation probe site  4. Every 4-6 hours:  If on nasal continuous positive airway pressure, respiratory therapy assess nares and determine need for appliance change or resting period.   Outcome: Progressing     Problem: Nutrition Deficit:  Goal: Optimize nutritional status  Outcome: Progressing

## 2022-10-29 NOTE — CARE COORDINATION
MALINDA received a call from [employer]. The packet was approved just needed progress notes faxed. Malinda faxed progress notes to [employer]. Ajit would like the patient to call them if no answer would like pt to leave a message.  Malinda is going to inform pt of conversation and give him the number to call ajit

## 2022-10-29 NOTE — PROGRESS NOTES
Daily Progress Note    Date:10/29/2022  Patient: Yuniel Gonzalez  : 1975  HAYLEY:[id number]  CODE:Full Code No additional code details  PCP:Jayant Stanton    Admit Date: 10/27/2022  5:55 AM   LOS: 2 days     Chief Complaint   Patient presents with    Alcohol Intoxication     Drank 2 pints of whiskey tonight, wants to go to rehab         Subjective: Pt feels like withdrawal is improving overall. He is less anxious and tremulous. Still not back to baseline. Continues to endorse generalized weakness. [facility] for [facility] at discharge. Hospital Summary:The patient is a 49 y.o. male with past medical history of anxiety and seizures who presented to [address] [place] [address] ED complaining of alcohol intoxication. Patient reported to the ED indicating he had been drinking heavily recently. Patient states he would like rehab. Patient also indicated having multiple falls over the past few weeks. Patient denied any injury. On examination in the ED patient was noted to have multiple bruises scattered throughout his body in multiple stages of healing. Patient indicated depression but denied SI or HI. Patient denies hallucinations or delusions. Chart review indicates patient has history of seizures with alcohol withdrawal in the past. Admitted to hospitalist service for EtOH withdrawal and started on CIWA protocol. Given 260 mg Phenobarbital IV on 10/28. Plan is to go to inpatient rehab at [facility] after discharge. Review of Systems   Constitutional:  Negative for chills and fever. Respiratory:  Negative for cough and shortness of breath. Cardiovascular:  Negative for chest pain and palpitations. Gastrointestinal:  Negative for abdominal pain and diarrhea.      Objective:      Vital signs in last 24 hours:  Patient Vitals for the past 24 hrs:   BP Temp Temp src Pulse Resp SpO2 Height Weight   10/29/22 0608 (!) 140/96 98.2 °F (36.8 °C) Oral 71 16 97 % -- --   10/29/22 0259 -- -- -- -- -- -- 6' 2\" (1.88 m) 192 lb 8 oz (87.3 kg)   10/29/22 0040 (!) 153/102 98.6 °F (37 °C) Oral 77 16 96 % -- --         I/O last 3 completed shifts:  In: -   Out: 2425 [Urine:2425]  No intake/output data recorded. Physical Exam  Constitutional:       Appearance: He is diaphoretic. He is not toxic-appearing. Comments: Appears anxious and tremulous   Cardiovascular:      Rate and Rhythm: Normal rate and regular rhythm. Pulses: Normal pulses. Heart sounds: Normal heart sounds. Pulmonary:      Effort: Pulmonary effort is normal. No respiratory distress. Breath sounds: Normal breath sounds. Abdominal:      General: Abdomen is flat. There is no distension. Palpations: Abdomen is soft. Tenderness: There is no abdominal tenderness.            Lab Review   Recent Results (from the past 24 hour(s))   Magnesium    Collection Time: 10/29/22  3:33 AM   Result Value Ref Range    Magnesium 1.5 (L) 1.6 - 2.6 mg/dL   Comprehensive Metabolic Panel    Collection Time: 10/29/22  3:33 AM   Result Value Ref Range    Sodium 136 136 - 145 mmol/L    Potassium 3.1 (L) 3.5 - 5.0 mmol/L    Chloride 95 (L) 98 - 111 mmol/L    CO2 27 22 - 29 mmol/L    Anion Gap 14 7 - 19 mmol/L    Glucose 85 74 - 109 mg/dL    BUN 5 (L) 6 - 20 mg/dL    Creatinine 0.6 0.5 - 1.2 mg/dL    Est, Glom Filt Rate >60 >60    Calcium 9.1 8.6 - 10.0 mg/dL    Total Protein 6.4 (L) 6.6 - 8.7 g/dL    Albumin 3.4 (L) 3.5 - 5.2 g/dL    Total Bilirubin 0.4 0.2 - 1.2 mg/dL    Alkaline Phosphatase 155 (H) 40 - 130 U/L    ALT 27 5 - 41 U/L    AST 57 (H) 5 - 40 U/L   CBC with Auto Differential    Collection Time: 10/29/22  3:33 AM   Result Value Ref Range    WBC 4.1 (L) 4.8 - 10.8 K/uL    RBC 3.61 (L) 4.70 - 6.10 M/uL    Hemoglobin 12.7 (L) 14.0 - 18.0 g/dL    Hematocrit 35.9 (L) 42.0 - 52.0 %    MCV 99.4 (H) 80.0 - 94.0 fL    MCH 35.2 (H) 27.0 - 31.0 pg    MCHC 35.4 33.0 - 37.0 g/dL    RDW 12.2 11.5 - 14.5 %    Platelets 726 (L) 423 - 400 K/uL    MPV 10.3 9.4 - 12.4 fL Neutrophils % 60.9 50.0 - 65.0 %    Lymphocytes % 23.1 20.0 - 40.0 %    Monocytes % 12.8 (H) 0.0 - 10.0 %    Eosinophils % 2.0 0.0 - 5.0 %    Basophils % 0.7 0.0 - 1.0 %    Neutrophils Absolute 2.5 1.5 - 7.5 K/uL    Immature Granulocytes # 0.0 K/uL    Lymphocytes Absolute 0.9 (L) 1.1 - 4.5 K/uL    Monocytes Absolute 0.50 0.00 - 0.90 K/uL    Eosinophils Absolute 0.10 0.00 - 0.60 K/uL    Basophils Absolute 0.00 0.00 - 0.20 K/uL             Current Facility-Administered Medications:     magnesium sulfate 4000 mg in 100 mL IVPB premix, 4,000 mg, IntraVENous, Once, Santana Ojeda MD    thiamine mononitrate tablet 100 mg, 100 mg, Oral, Daily, Nichol Schumacher MD, 100 mg at 10/29/22 5022    sodium chloride flush 0.9 % injection 5-40 mL, 5-40 mL, IntraVENous, 2 times per day, Jordan Placedo, APRN - CNP, 10 mL at 10/28/22 1110    sodium chloride flush 0.9 % injection 5-40 mL, 5-40 mL, IntraVENous, PRN, Cheryl Chase, APRN - CNP    0.9 % sodium chloride infusion, , IntraVENous, PRN, Jordan Placedo, APRN - CNP    enoxaparin Sodium (LOVENOX) injection 30 mg, 30 mg, SubCUTAneous, BID, Jordan Placedo, APRN - CNP, 30 mg at 10/29/22 0922    ondansetron (ZOFRAN-ODT) disintegrating tablet 4 mg, 4 mg, Oral, Q8H PRN **OR** ondansetron (ZOFRAN) injection 4 mg, 4 mg, IntraVENous, Q6H PRN, Jordan Placedo, APRN - CNP    polyethylene glycol (GLYCOLAX) packet 17 g, 17 g, Oral, Daily PRN, Jordan Placedo, APRN - CNP    acetaminophen (TYLENOL) tablet 650 mg, 650 mg, Oral, Q6H PRN, 650 mg at 10/27/22 1720 **OR** acetaminophen (TYLENOL) suppository 650 mg, 650 mg, Rectal, Q6H PRN, TIBURCIO Lau CNP    lactated ringers infusion, , IntraVENous, Continuous, TIBURCIO Bhatt CNP, Last Rate: 100 mL/hr at 10/27/22 1938, New Bag at 10/27/22 1938    multivitamin 1 tablet, 1 tablet, Oral, Daily, TIBURCIO Lau CNP, 1 tablet at 10/29/22 9036    potassium chloride (KLOR-CON M) extended release tablet 40 mEq, 40 mEq, Oral, PRN **OR** potassium bicarb-citric acid (EFFER-K) effervescent tablet 40 mEq, 40 mEq, Oral, PRN **OR** potassium chloride 10 mEq/100 mL IVPB (Peripheral Line), 10 mEq, IntraVENous, PRN, Nathanel Midget, APRN - CNP    magnesium sulfate 2000 mg in 50 mL IVPB premix, 2,000 mg, IntraVENous, PRN, Nathanel Midget, APRN - CNP    nicotine (NICODERM CQ) 21 MG/24HR 1 patch, 1 patch, TransDERmal, Daily, Nathanel Midget, APRN - CNP, 1 patch at 10/29/22 0922    LORazepam (ATIVAN) tablet 1 mg, 1 mg, Oral, Q1H PRN, 1 mg at 10/29/22 0357 **OR** LORazepam (ATIVAN) injection 1 mg, 1 mg, IntraVENous, Q1H PRN **OR** LORazepam (ATIVAN) tablet 2 mg, 2 mg, Oral, Q1H PRN, 2 mg at 10/29/22 0356 **OR** LORazepam (ATIVAN) injection 2 mg, 2 mg, IntraVENous, Q1H PRN, 2 mg at 10/28/22 1834 **OR** LORazepam (ATIVAN) tablet 3 mg, 3 mg, Oral, Q1H PRN, 3 mg at 10/29/22 0922 **OR** LORazepam (ATIVAN) injection 3 mg, 3 mg, IntraVENous, Q1H PRN, 3 mg at 10/27/22 2343 **OR** LORazepam (ATIVAN) tablet 4 mg, 4 mg, Oral, Q1H PRN **OR** LORazepam (ATIVAN) injection 4 mg, 4 mg, IntraVENous, Q1H PRN, Cheryl Chase, APRN - CNP    sodium chloride (OCEAN, BABY AYR) 0.65 % nasal spray 1 spray, 1 spray, Each Nostril, Q4H PRN, Nathanel Midget, APRN - CNP      Imaging:  XR PELVIS (1-2 VIEWS)    Result Date: 10/27/2022  No discernible acute osseous abnormality. Recommendation: Follow up as clinically indicated. Electronically Signed by Baljit Harman MD at 27-Oct-2022 09:54:54 AM EST             CT HEAD WO CONTRAST    Result Date: 10/27/2022  1. Mild atrophic changes. 2.  Otherwise no acute intracranial abnormalities. Recommendation: Follow up as clinically indicated. All CT scans at this facility utilize dose modulation, iterative reconstruction, and/or weight based dosing when appropriate to reduce radiation dose to as low as reasonably achievable.  Electronically Signed by Leah Lu MD at 27-Oct-2022 09:15:26 AM EST             CT CERVICAL SPINE WO CONTRAST    Result Date: 10/27/2022  1. No acute osseous abnormality. 2.  Degenerative changes as above. Recommendation: Follow up as clinically indicated. All CT scans at this facility utilize dose modulation, iterative reconstruction, and/or weight based dosing when appropriate to reduce radiation dose to as low as reasonably achievable. Electronically Signed by Arleth Ludwig MD at 27-Oct-2022 09:18:45 AM EST             XR CHEST PORTABLE    Result Date: 10/27/2022  No active pulmonary disease. Recommendation: Follow up as clinically indicated. Electronically Signed by Cheryl Cohn MD at 27-Oct-2022 09:42:49 AM EST                  Assessment/Plan  Principal Problem:    Alcohol intoxication in active alcoholic without complication ([facility] [address]  Active Problems:    Nicotine dependence  Resolved Problems:    * No resolved hospital problems.  *     -- Continue CIWA protocol  -- Given 260 mg Phenobarbital x1 dose on 10/28  -- Give 130 mg phenobarbital on 10/29  -- Can repeat phenobarb 130 mg if needed for symptom control  -- PO thiamine  -- Inpatient alcohol rehab after discharge  -- Nicotine patch      DVT prophylaxis: Lovenox    DISPOSITION:  Home and then inpatient alcohol rehabilitation, likely Sunday versus Monday    Full Code No additional code details        Tabitha Tovar MD 10/29/2022 12:03 PM

## 2022-10-30 LAB
ALBUMIN SERPL-MCNC: 3.7 G/DL (ref 3.5–5.2)
ALP BLD-CCNC: 149 U/L (ref 40–130)
ALT SERPL-CCNC: 33 U/L (ref 5–41)
ANION GAP SERPL CALCULATED.3IONS-SCNC: 12 MMOL/L (ref 7–19)
AST SERPL-CCNC: 61 U/L (ref 5–40)
BASOPHILS ABSOLUTE: 0 K/UL (ref 0–0.2)
BASOPHILS RELATIVE PERCENT: 1 % (ref 0–1)
BILIRUB SERPL-MCNC: 0.4 MG/DL (ref 0.2–1.2)
BUN BLDV-MCNC: 8 MG/DL (ref 6–20)
CALCIUM SERPL-MCNC: 8.8 MG/DL (ref 8.6–10)
CHLORIDE BLD-SCNC: 99 MMOL/L (ref 98–111)
CO2: 24 MMOL/L (ref 22–29)
CREAT SERPL-MCNC: 0.5 MG/DL (ref 0.5–1.2)
EOSINOPHILS ABSOLUTE: 0.1 K/UL (ref 0–0.6)
EOSINOPHILS RELATIVE PERCENT: 2.9 % (ref 0–5)
GFR SERPL CREATININE-BSD FRML MDRD: >60 ML/MIN/{1.73_M2}
GLUCOSE BLD-MCNC: 91 MG/DL (ref 74–109)
HCT VFR BLD CALC: 35.7 % (ref 42–52)
HEMOGLOBIN: 12.6 G/DL (ref 14–18)
IMMATURE GRANULOCYTES #: 0 K/UL
LYMPHOCYTES ABSOLUTE: 1.1 K/UL (ref 1.1–4.5)
LYMPHOCYTES RELATIVE PERCENT: 27.1 % (ref 20–40)
MAGNESIUM: 1.6 MG/DL (ref 1.6–2.6)
MCH RBC QN AUTO: 35.7 PG (ref 27–31)
MCHC RBC AUTO-ENTMCNC: 35.3 G/DL (ref 33–37)
MCV RBC AUTO: 101.1 FL (ref 80–94)
MONOCYTES ABSOLUTE: 0.5 K/UL (ref 0–0.9)
MONOCYTES RELATIVE PERCENT: 12.4 % (ref 0–10)
NEUTROPHILS ABSOLUTE: 2.4 K/UL (ref 1.5–7.5)
NEUTROPHILS RELATIVE PERCENT: 56.1 % (ref 50–65)
PDW BLD-RTO: 12.3 % (ref 11.5–14.5)
PLATELET # BLD: 129 K/UL (ref 130–400)
PMV BLD AUTO: 10.8 FL (ref 9.4–12.4)
POTASSIUM SERPL-SCNC: 3.5 MMOL/L (ref 3.5–5)
RBC # BLD: 3.53 M/UL (ref 4.7–6.1)
SODIUM BLD-SCNC: 135 MMOL/L (ref 136–145)
TOTAL PROTEIN: 5.6 G/DL (ref 6.6–8.7)
WBC # BLD: 4.2 K/UL (ref 4.8–10.8)

## 2022-10-30 PROCEDURE — 2580000003 HC RX 258: Performed by: NURSE PRACTITIONER

## 2022-10-30 PROCEDURE — 6370000000 HC RX 637 (ALT 250 FOR IP): Performed by: EMERGENCY MEDICINE

## 2022-10-30 PROCEDURE — 80053 COMPREHEN METABOLIC PANEL: CPT

## 2022-10-30 PROCEDURE — 36415 COLL VENOUS BLD VENIPUNCTURE: CPT

## 2022-10-30 PROCEDURE — 6360000002 HC RX W HCPCS: Performed by: NURSE PRACTITIONER

## 2022-10-30 PROCEDURE — 85025 COMPLETE CBC W/AUTO DIFF WBC: CPT

## 2022-10-30 PROCEDURE — 83735 ASSAY OF MAGNESIUM: CPT

## 2022-10-30 PROCEDURE — 6370000000 HC RX 637 (ALT 250 FOR IP): Performed by: NURSE PRACTITIONER

## 2022-10-30 PROCEDURE — 1210000000 HC MED SURG R&B

## 2022-10-30 PROCEDURE — 6360000002 HC RX W HCPCS: Performed by: STUDENT IN AN ORGANIZED HEALTH CARE EDUCATION/TRAINING PROGRAM

## 2022-10-30 RX ORDER — PHENOBARBITAL SODIUM 130 MG/ML
130 INJECTION INTRAMUSCULAR ONCE
Status: COMPLETED | OUTPATIENT
Start: 2022-10-30 | End: 2022-10-30

## 2022-10-30 RX ADMIN — LORAZEPAM 2 MG: 2 TABLET ORAL at 20:10

## 2022-10-30 RX ADMIN — PHENOBARBITAL SODIUM 130 MG: 130 INJECTION INTRAMUSCULAR; INTRAVENOUS at 14:52

## 2022-10-30 RX ADMIN — LORAZEPAM 2 MG: 2 INJECTION INTRAMUSCULAR; INTRAVENOUS at 13:09

## 2022-10-30 RX ADMIN — LORAZEPAM 2 MG: 2 TABLET ORAL at 04:30

## 2022-10-30 RX ADMIN — Medication 10 ML: at 20:11

## 2022-10-30 RX ADMIN — LORAZEPAM 2 MG: 2 TABLET ORAL at 00:56

## 2022-10-30 RX ADMIN — THERA TABS 1 TABLET: TAB at 09:58

## 2022-10-30 RX ADMIN — LORAZEPAM 2 MG: 2 INJECTION INTRAMUSCULAR; INTRAVENOUS at 09:59

## 2022-10-30 RX ADMIN — THIAMINE HCL TAB 100 MG 100 MG: 100 TAB at 09:58

## 2022-10-30 RX ADMIN — Medication 10 ML: at 09:59

## 2022-10-30 RX ADMIN — ENOXAPARIN SODIUM 30 MG: 100 INJECTION SUBCUTANEOUS at 20:10

## 2022-10-30 RX ADMIN — ENOXAPARIN SODIUM 30 MG: 100 INJECTION SUBCUTANEOUS at 09:59

## 2022-10-30 ASSESSMENT — ENCOUNTER SYMPTOMS
SHORTNESS OF BREATH: 0
COUGH: 0
DIARRHEA: 0
ABDOMINAL PAIN: 0

## 2022-10-30 NOTE — CARE COORDINATION
MALINDA confirmed with [facility] that they will have a spot available for Pt on Monday October 31st if medically cleared. Galo Manzano requested to be notified as early as possible if Pt is going to DC to their facility or if it will be another day.      [phone]; option 1  Ask for [address]     [phone] fax number for clinical updates    [facility]  [place], [address]    Electronically signed by Hannah Mak on 10/30/2022 at 3:21 PM

## 2022-10-30 NOTE — CARE COORDINATION
MALINDA spoke with the intake department at [facility]. Per intake, they requested the latest progress notes from today once they are available. Referral is still pending at this time.      [phone]; option 1  Ask for [address]    [phone] fax number for clinical updates    Electronically signed by Cass Aleman on 10/30/2022 at 9:42 AM

## 2022-10-30 NOTE — PROGRESS NOTES
Daily Progress Note    Date:10/30/2022  Patient: Chyna Khanna  : 1975  DGI:[id number]  CODE:Full Code No additional code details  PCP:Jayant Stanton    Admit Date: 10/27/2022  5:55 AM   LOS: 3 days     Chief Complaint   Patient presents with    Alcohol Intoxication     Drank 2 pints of whiskey tonight, wants to go to rehab         Subjective: Withdrawal symptoms continue to improve. Anxiety significantly better, however he is still tremulous. He feels more awake and alert than previous days. Feels weak overall. Weakness is improved from yesterday. Hospital Summary:The patient is a 49 y.o. male with past medical history of anxiety and seizures who presented to [address] [place] [address] ED complaining of alcohol intoxication. Patient reported to the ED indicating he had been drinking heavily recently. Patient states he would like rehab. Patient also indicated having multiple falls over the past few weeks. Patient denied any injury. On examination in the ED patient was noted to have multiple bruises scattered throughout his body in multiple stages of healing. Patient indicated depression but denied SI or HI. Patient denies hallucinations or delusions. Chart review indicates patient has history of seizures with alcohol withdrawal in the past. Admitted to hospitalist service for EtOH withdrawal and started on CIWA protocol. Given 260 mg Phenobarbital IV on 10/28. Plan is to go to inpatient rehab at [facility] after discharge. Review of Systems   Constitutional:  Negative for chills and fever. Respiratory:  Negative for cough and shortness of breath. Cardiovascular:  Negative for chest pain and palpitations. Gastrointestinal:  Negative for abdominal pain and diarrhea.      Objective:      Vital signs in last 24 hours:  Patient Vitals for the past 24 hrs:   BP Temp Temp src Pulse Resp SpO2 Weight   10/30/22 1138 (!) 134/92 96.8 °F (36 °C) Temporal 87 -- 96 % --   10/30/22 0635 133/80 97.9 °F (36.6 °C) -- 58 16 97 % 192 lb 11.2 oz (87.4 kg)   10/30/22 0036 (!) 134/93 98.1 °F (36.7 °C) -- 70 20 97 % --         I/O last 3 completed shifts:  In: -   Out: 1175 [Urine:1175]  No intake/output data recorded. Physical Exam  Vitals and nursing note reviewed. Constitutional:       Appearance: He is not toxic-appearing or diaphoretic. Comments: Mildly tremulous   Cardiovascular:      Rate and Rhythm: Normal rate and regular rhythm. Pulses: Normal pulses. Heart sounds: Normal heart sounds. Pulmonary:      Effort: Pulmonary effort is normal. No respiratory distress. Breath sounds: Normal breath sounds. Abdominal:      General: Abdomen is flat. There is no distension. Palpations: Abdomen is soft. Tenderness: There is no abdominal tenderness.            Lab Review   Recent Results (from the past 24 hour(s))   Magnesium    Collection Time: 10/30/22  4:11 AM   Result Value Ref Range    Magnesium 1.6 1.6 - 2.6 mg/dL   Comprehensive Metabolic Panel    Collection Time: 10/30/22  4:11 AM   Result Value Ref Range    Sodium 135 (L) 136 - 145 mmol/L    Potassium 3.5 3.5 - 5.0 mmol/L    Chloride 99 98 - 111 mmol/L    CO2 24 22 - 29 mmol/L    Anion Gap 12 7 - 19 mmol/L    Glucose 91 74 - 109 mg/dL    BUN 8 6 - 20 mg/dL    Creatinine 0.5 0.5 - 1.2 mg/dL    Est, Glom Filt Rate >60 >60    Calcium 8.8 8.6 - 10.0 mg/dL    Total Protein 5.6 (L) 6.6 - 8.7 g/dL    Albumin 3.7 3.5 - 5.2 g/dL    Total Bilirubin 0.4 0.2 - 1.2 mg/dL    Alkaline Phosphatase 149 (H) 40 - 130 U/L    ALT 33 5 - 41 U/L    AST 61 (H) 5 - 40 U/L   CBC with Auto Differential    Collection Time: 10/30/22  4:11 AM   Result Value Ref Range    WBC 4.2 (L) 4.8 - 10.8 K/uL    RBC 3.53 (L) 4.70 - 6.10 M/uL    Hemoglobin 12.6 (L) 14.0 - 18.0 g/dL    Hematocrit 35.7 (L) 42.0 - 52.0 %    .1 (H) 80.0 - 94.0 fL    MCH 35.7 (H) 27.0 - 31.0 pg    MCHC 35.3 33.0 - 37.0 g/dL    RDW 12.3 11.5 - 14.5 %    Platelets 298 (L) 168 - 400 K/uL    MPV 10.8 9.4 - 12.4 fL    Neutrophils % 56.1 50.0 - 65.0 %    Lymphocytes % 27.1 20.0 - 40.0 %    Monocytes % 12.4 (H) 0.0 - 10.0 %    Eosinophils % 2.9 0.0 - 5.0 %    Basophils % 1.0 0.0 - 1.0 %    Neutrophils Absolute 2.4 1.5 - 7.5 K/uL    Immature Granulocytes # 0.0 K/uL    Lymphocytes Absolute 1.1 1.1 - 4.5 K/uL    Monocytes Absolute 0.50 0.00 - 0.90 K/uL    Eosinophils Absolute 0.10 0.00 - 0.60 K/uL    Basophils Absolute 0.00 0.00 - 0.20 K/uL             Current Facility-Administered Medications:     magnesium sulfate 4000 mg in 100 mL IVPB premix, 4,000 mg, IntraVENous, Once, Adelaida Felix MD    thiamine mononitrate tablet 100 mg, 100 mg, Oral, Daily, Juvencio Taveras MD, 100 mg at 10/30/22 0784    sodium chloride flush 0.9 % injection 5-40 mL, 5-40 mL, IntraVENous, 2 times per day, TIBURCIO Felder - CNP, 10 mL at 10/30/22 0959    sodium chloride flush 0.9 % injection 5-40 mL, 5-40 mL, IntraVENous, PRN, TIBURCIO Bhatt - CNP    0.9 % sodium chloride infusion, , IntraVENous, PRN, TIBURCIO Felder CNP    enoxaparin Sodium (LOVENOX) injection 30 mg, 30 mg, SubCUTAneous, BID, TIBURCIO Felder CNP, 30 mg at 10/30/22 0959    ondansetron (ZOFRAN-ODT) disintegrating tablet 4 mg, 4 mg, Oral, Q8H PRN **OR** ondansetron (ZOFRAN) injection 4 mg, 4 mg, IntraVENous, Q6H PRN, TIBURCIO Felder CNP    polyethylene glycol (GLYCOLAX) packet 17 g, 17 g, Oral, Daily PRN, TIBURCIO Felder CNP    acetaminophen (TYLENOL) tablet 650 mg, 650 mg, Oral, Q6H PRN, 650 mg at 10/27/22 1720 **OR** acetaminophen (TYLENOL) suppository 650 mg, 650 mg, Rectal, Q6H PRN, TIBURCIO Felder CNP    lactated ringers infusion, , IntraVENous, Continuous, TIBURCIO Bhatt CNP, Last Rate: 100 mL/hr at 10/27/22 1938, New Bag at 10/27/22 1938    multivitamin 1 tablet, 1 tablet, Oral, Daily, TIBURCIO Felder CNP, 1 tablet at 10/30/22 0958    potassium chloride (KLOR-CON M) extended release tablet 40 mEq, 40 mEq, Oral, PRN **OR** potassium bicarb-citric acid (EFFER-K) effervescent tablet 40 mEq, 40 mEq, Oral, PRN **OR** potassium chloride 10 mEq/100 mL IVPB (Peripheral Line), 10 mEq, IntraVENous, PRN, Martha Hait, APRN - CNP    magnesium sulfate 2000 mg in 50 mL IVPB premix, 2,000 mg, IntraVENous, PRN, Martha Hait, APRN - CNP    nicotine (NICODERM CQ) 21 MG/24HR 1 patch, 1 patch, TransDERmal, Daily, Martha Hait, APRN - CNP, 1 patch at 10/30/22 0959    LORazepam (ATIVAN) tablet 1 mg, 1 mg, Oral, Q1H PRN, 1 mg at 10/29/22 0357 **OR** LORazepam (ATIVAN) injection 1 mg, 1 mg, IntraVENous, Q1H PRN **OR** LORazepam (ATIVAN) tablet 2 mg, 2 mg, Oral, Q1H PRN, 2 mg at 10/30/22 0430 **OR** LORazepam (ATIVAN) injection 2 mg, 2 mg, IntraVENous, Q1H PRN, 2 mg at 10/30/22 1309 **OR** LORazepam (ATIVAN) tablet 3 mg, 3 mg, Oral, Q1H PRN, 3 mg at 10/29/22 0922 **OR** LORazepam (ATIVAN) injection 3 mg, 3 mg, IntraVENous, Q1H PRN, 3 mg at 10/27/22 2343 **OR** LORazepam (ATIVAN) tablet 4 mg, 4 mg, Oral, Q1H PRN **OR** LORazepam (ATIVAN) injection 4 mg, 4 mg, IntraVENous, Q1H PRN, Martha Hait, APRN - CNP, 4 mg at 10/29/22 1322    sodium chloride (OCEAN, BABY AYR) 0.65 % nasal spray 1 spray, 1 spray, Each Nostril, Q4H PRN, Martha Hait, APRN - CNP      Imaging:  XR PELVIS (1-2 VIEWS)    Result Date: 10/27/2022  No discernible acute osseous abnormality. Recommendation: Follow up as clinically indicated. Electronically Signed by Ladell Kayser MD at 27-Oct-2022 09:54:54 AM EST             CT HEAD WO CONTRAST    Result Date: 10/27/2022  1. Mild atrophic changes. 2.  Otherwise no acute intracranial abnormalities. Recommendation: Follow up as clinically indicated. All CT scans at this facility utilize dose modulation, iterative reconstruction, and/or weight based dosing when appropriate to reduce radiation dose to as low as reasonably achievable.  Electronically Signed by Theron Montoya, Kayden Matute MD at 27-Oct-2022 09:15:26 AM EST             CT CERVICAL SPINE WO CONTRAST    Result Date: 10/27/2022  1. No acute osseous abnormality. 2.  Degenerative changes as above. Recommendation: Follow up as clinically indicated. All CT scans at this facility utilize dose modulation, iterative reconstruction, and/or weight based dosing when appropriate to reduce radiation dose to as low as reasonably achievable. Electronically Signed by Brenda Carbajal MD at 27-Oct-2022 09:18:45 AM EST             XR CHEST PORTABLE    Result Date: 10/27/2022  No active pulmonary disease. Recommendation: Follow up as clinically indicated. Electronically Signed by Rocío Lozada MD at 27-Oct-2022 09:42:49 AM EST                  Assessment/Plan  Principal Problem:    Alcohol intoxication in active alcoholic without complication ([facility] [address]  Active Problems:    Nicotine dependence  Resolved Problems:    * No resolved hospital problems. *     -- Continue CIWA protocol  -- Given 260 mg Phenobarbital x1 dose on 10/28  -- Give 130 mg phenobarbital on 10/29  -- Give final 130 mg phenobarbital on 10/30  -- PO thiamine  -- Inpatient alcohol rehab after discharge  -- Nicotine patch      DVT prophylaxis: Lovenox    DISPOSITION: I expect withdrawal symptoms to continue to improve, patient should be stable for discharge by tomorrow.     Full Code No additional code details        Venus Hodge MD 10/30/2022 1:55 PM

## 2022-10-31 LAB
ALBUMIN SERPL-MCNC: 3.7 G/DL (ref 3.5–5.2)
ALP BLD-CCNC: 137 U/L (ref 40–130)
ALT SERPL-CCNC: 51 U/L (ref 5–41)
ANION GAP SERPL CALCULATED.3IONS-SCNC: 11 MMOL/L (ref 7–19)
AST SERPL-CCNC: 76 U/L (ref 5–40)
BASOPHILS ABSOLUTE: 0.1 K/UL (ref 0–0.2)
BASOPHILS RELATIVE PERCENT: 1.1 % (ref 0–1)
BILIRUB SERPL-MCNC: 0.3 MG/DL (ref 0.2–1.2)
BUN BLDV-MCNC: 8 MG/DL (ref 6–20)
CALCIUM SERPL-MCNC: 9 MG/DL (ref 8.6–10)
CHLORIDE BLD-SCNC: 102 MMOL/L (ref 98–111)
CO2: 23 MMOL/L (ref 22–29)
CREAT SERPL-MCNC: 0.5 MG/DL (ref 0.5–1.2)
EOSINOPHILS ABSOLUTE: 0.1 K/UL (ref 0–0.6)
EOSINOPHILS RELATIVE PERCENT: 3.1 % (ref 0–5)
GFR SERPL CREATININE-BSD FRML MDRD: >60 ML/MIN/{1.73_M2}
GLUCOSE BLD-MCNC: 93 MG/DL (ref 74–109)
HCT VFR BLD CALC: 35.7 % (ref 42–52)
HEMOGLOBIN: 12.4 G/DL (ref 14–18)
IMMATURE GRANULOCYTES #: 0 K/UL
LYMPHOCYTES ABSOLUTE: 1 K/UL (ref 1.1–4.5)
LYMPHOCYTES RELATIVE PERCENT: 23 % (ref 20–40)
MCH RBC QN AUTO: 35.2 PG (ref 27–31)
MCHC RBC AUTO-ENTMCNC: 34.7 G/DL (ref 33–37)
MCV RBC AUTO: 101.4 FL (ref 80–94)
MONOCYTES ABSOLUTE: 0.6 K/UL (ref 0–0.9)
MONOCYTES RELATIVE PERCENT: 13.3 % (ref 0–10)
NEUTROPHILS ABSOLUTE: 2.7 K/UL (ref 1.5–7.5)
NEUTROPHILS RELATIVE PERCENT: 58.6 % (ref 50–65)
PDW BLD-RTO: 12.4 % (ref 11.5–14.5)
PLATELET # BLD: 143 K/UL (ref 130–400)
PMV BLD AUTO: 10.9 FL (ref 9.4–12.4)
POTASSIUM SERPL-SCNC: 3.5 MMOL/L (ref 3.5–5)
RBC # BLD: 3.52 M/UL (ref 4.7–6.1)
SODIUM BLD-SCNC: 136 MMOL/L (ref 136–145)
TOTAL PROTEIN: 5.6 G/DL (ref 6.6–8.7)
WBC # BLD: 4.5 K/UL (ref 4.8–10.8)

## 2022-10-31 PROCEDURE — 36415 COLL VENOUS BLD VENIPUNCTURE: CPT

## 2022-10-31 PROCEDURE — 6360000002 HC RX W HCPCS: Performed by: NURSE PRACTITIONER

## 2022-10-31 PROCEDURE — 80053 COMPREHEN METABOLIC PANEL: CPT

## 2022-10-31 PROCEDURE — 2580000003 HC RX 258: Performed by: NURSE PRACTITIONER

## 2022-10-31 PROCEDURE — 97162 PT EVAL MOD COMPLEX 30 MIN: CPT

## 2022-10-31 PROCEDURE — 1210000000 HC MED SURG R&B

## 2022-10-31 PROCEDURE — 6370000000 HC RX 637 (ALT 250 FOR IP): Performed by: STUDENT IN AN ORGANIZED HEALTH CARE EDUCATION/TRAINING PROGRAM

## 2022-10-31 PROCEDURE — 97116 GAIT TRAINING THERAPY: CPT

## 2022-10-31 PROCEDURE — 85025 COMPLETE CBC W/AUTO DIFF WBC: CPT

## 2022-10-31 PROCEDURE — 6370000000 HC RX 637 (ALT 250 FOR IP): Performed by: EMERGENCY MEDICINE

## 2022-10-31 PROCEDURE — 6370000000 HC RX 637 (ALT 250 FOR IP): Performed by: NURSE PRACTITIONER

## 2022-10-31 RX ORDER — THIAMINE MONONITRATE (VIT B1) 100 MG
100 TABLET ORAL DAILY
Qty: 30 TABLET | Refills: 0 | Status: SHIPPED | OUTPATIENT
Start: 2022-10-31

## 2022-10-31 RX ORDER — MECOBALAMIN 5000 MCG
5 TABLET,DISINTEGRATING ORAL NIGHTLY
Status: DISCONTINUED | OUTPATIENT
Start: 2022-10-31 | End: 2022-11-01 | Stop reason: HOSPADM

## 2022-10-31 RX ADMIN — THERA TABS 1 TABLET: TAB at 08:43

## 2022-10-31 RX ADMIN — Medication 10 ML: at 21:43

## 2022-10-31 RX ADMIN — THIAMINE HCL TAB 100 MG 100 MG: 100 TAB at 08:43

## 2022-10-31 RX ADMIN — LORAZEPAM 2 MG: 2 TABLET ORAL at 03:17

## 2022-10-31 RX ADMIN — ENOXAPARIN SODIUM 30 MG: 100 INJECTION SUBCUTANEOUS at 08:40

## 2022-10-31 RX ADMIN — Medication 10 ML: at 08:43

## 2022-10-31 RX ADMIN — Medication 5 MG: at 21:47

## 2022-10-31 ASSESSMENT — ENCOUNTER SYMPTOMS
ABDOMINAL PAIN: 0
DIARRHEA: 0
COUGH: 0
SHORTNESS OF BREATH: 0

## 2022-10-31 ASSESSMENT — PAIN SCALES - GENERAL: PAINLEVEL_OUTOF10: 4

## 2022-10-31 ASSESSMENT — PAIN DESCRIPTION - ORIENTATION: ORIENTATION: RIGHT;LEFT

## 2022-10-31 ASSESSMENT — PAIN DESCRIPTION - LOCATION: LOCATION: RIB CAGE

## 2022-10-31 NOTE — PROGRESS NOTES
Daily Progress Note    Date:10/31/2022  Patient: Kim Dhaliwal  : 1975  XVI:[id number]  CODE:Full Code No additional code details  PCP:Jayant Stanton    Admit Date: 10/27/2022  5:55 AM   LOS: 4 days     Chief Complaint   Patient presents with    Alcohol Intoxication     Drank 2 pints of whiskey tonight, wants to go to rehab         Subjective: Patient feels weak in his lower extremities. He walked with PT and had some difficulty so we will keep him inpatient for more therapy tomorrow. Received Ativan overnight, order now discontinued. Plan is for [facility] at discharge. Hospital Summary: The patient is a 49 y.o. male with past medical history of anxiety and seizures who presented to [facility] ED complaining of alcohol intoxication. Patient reported to the ED indicating he had been drinking heavily recently. Patient states he would like rehab. Patient also indicated having multiple falls over the past few weeks. Patient denied any injury. On examination in the ED patient was noted to have multiple bruises scattered throughout his body in multiple stages of healing. Patient indicated depression but denied SI or HI. Patient denies hallucinations or delusions. Chart review indicates patient has history of seizures with alcohol withdrawal in the past. Admitted to hospitalist service for EtOH withdrawal and started on CIWA protocol. Given 260 mg Phenobarbital IV on 10/28. Plan is to go to inpatient rehab at [facility] after discharge. Review of Systems   Constitutional:  Negative for chills and fever. Respiratory:  Negative for cough and shortness of breath. Cardiovascular:  Negative for chest pain and palpitations. Gastrointestinal:  Negative for abdominal pain and diarrhea.      Objective:      Vital signs in last 24 hours:  Patient Vitals for the past 24 hrs:   BP Temp Temp src Pulse Resp SpO2 Weight   10/31/22 1006 119/86 98.1 °F (36.7 °C) Oral (!) 106 18 98 % --   10/31/22 0815 125/81 97.8 °F (36.6 °C) Oral 64 18 -- --   10/31/22 0445 131/81 97.2 °F (36.2 °C) -- 69 16 97 % --   10/31/22 0312 (!) 144/102 -- -- 82 -- -- --   10/30/22 2345 (!) 147/90 97.4 °F (36.3 °C) -- 79 18 98 % 194 lb 11.2 oz (88.3 kg)   10/30/22 2010 (!) 145/93 -- -- 78 20 95 % --   10/30/22 1723 (!) 141/100 97.2 °F (36.2 °C) Temporal 70 -- 98 % --       I/O last 3 completed shifts: In: 240 [P.O.:240]  Out: 7324 [HZYJW:7842]  I/O this shift:  In: 20 [I.V.:20]  Out: -     Physical Exam  Vitals and nursing note reviewed. Constitutional:       Appearance: He is not toxic-appearing or diaphoretic. Comments: Mildly tremulous   Cardiovascular:      Rate and Rhythm: Normal rate and regular rhythm. Pulses: Normal pulses. Heart sounds: Normal heart sounds. Pulmonary:      Effort: Pulmonary effort is normal. No respiratory distress. Breath sounds: Normal breath sounds. Abdominal:      General: Abdomen is flat. There is no distension. Palpations: Abdomen is soft. Tenderness: There is no abdominal tenderness.            Lab Review   Recent Results (from the past 24 hour(s))   Comprehensive Metabolic Panel    Collection Time: 10/31/22  2:58 AM   Result Value Ref Range    Sodium 136 136 - 145 mmol/L    Potassium 3.5 3.5 - 5.0 mmol/L    Chloride 102 98 - 111 mmol/L    CO2 23 22 - 29 mmol/L    Anion Gap 11 7 - 19 mmol/L    Glucose 93 74 - 109 mg/dL    BUN 8 6 - 20 mg/dL    Creatinine 0.5 0.5 - 1.2 mg/dL    Est, Glom Filt Rate >60 >60    Calcium 9.0 8.6 - 10.0 mg/dL    Total Protein 5.6 (L) 6.6 - 8.7 g/dL    Albumin 3.7 3.5 - 5.2 g/dL    Total Bilirubin 0.3 0.2 - 1.2 mg/dL    Alkaline Phosphatase 137 (H) 40 - 130 U/L    ALT 51 (H) 5 - 41 U/L    AST 76 (H) 5 - 40 U/L   CBC with Auto Differential    Collection Time: 10/31/22  2:58 AM   Result Value Ref Range    WBC 4.5 (L) 4.8 - 10.8 K/uL    RBC 3.52 (L) 4.70 - 6.10 M/uL    Hemoglobin 12.4 (L) 14.0 - 18.0 g/dL    Hematocrit 35.7 (L) 42.0 - 52.0 %    .4 (H) 80.0 - 94.0 fL    MCH 35.2 (H) 27.0 - 31.0 pg    MCHC 34.7 33.0 - 37.0 g/dL    RDW 12.4 11.5 - 14.5 %    Platelets 648 584 - 615 K/uL    MPV 10.9 9.4 - 12.4 fL    Neutrophils % 58.6 50.0 - 65.0 %    Lymphocytes % 23.0 20.0 - 40.0 %    Monocytes % 13.3 (H) 0.0 - 10.0 %    Eosinophils % 3.1 0.0 - 5.0 %    Basophils % 1.1 (H) 0.0 - 1.0 %    Neutrophils Absolute 2.7 1.5 - 7.5 K/uL    Immature Granulocytes # 0.0 K/uL    Lymphocytes Absolute 1.0 (L) 1.1 - 4.5 K/uL    Monocytes Absolute 0.60 0.00 - 0.90 K/uL    Eosinophils Absolute 0.10 0.00 - 0.60 K/uL    Basophils Absolute 0.10 0.00 - 0.20 K/uL             Current Facility-Administered Medications:     magnesium sulfate 4000 mg in 100 mL IVPB premix, 4,000 mg, IntraVENous, Once, Vonda Jimenes MD    thiamine mononitrate tablet 100 mg, 100 mg, Oral, Daily, Daiana Nicolas MD, 100 mg at 10/31/22 0843    sodium chloride flush 0.9 % injection 5-40 mL, 5-40 mL, IntraVENous, 2 times per day, Malissa Meigs, APRN - CNP, 10 mL at 10/31/22 0843    sodium chloride flush 0.9 % injection 5-40 mL, 5-40 mL, IntraVENous, PRN, TIBURCIO Bhatt - CNP    0.9 % sodium chloride infusion, , IntraVENous, PRN, Malissa Meigs, APRN - CNP    enoxaparin Sodium (LOVENOX) injection 30 mg, 30 mg, SubCUTAneous, BID, Malissa Meigs, APRN - CNP, 30 mg at 10/31/22 0840    ondansetron (ZOFRAN-ODT) disintegrating tablet 4 mg, 4 mg, Oral, Q8H PRN **OR** ondansetron (ZOFRAN) injection 4 mg, 4 mg, IntraVENous, Q6H PRN, Malissa Meigs, APRN - CNP    polyethylene glycol (GLYCOLAX) packet 17 g, 17 g, Oral, Daily PRN, Malissa Meigs, APRN - CNP    acetaminophen (TYLENOL) tablet 650 mg, 650 mg, Oral, Q6H PRN, 650 mg at 10/27/22 1720 **OR** acetaminophen (TYLENOL) suppository 650 mg, 650 mg, Rectal, Q6H PRN, Malissa Meigs, APRN - CNP    lactated ringers infusion, , IntraVENous, Continuous, TIBURCIO Bhatt CNP, Last Rate: 100 mL/hr at 10/27/22 1938, New Bag at 10/27/22 1938    multivitamin 1 tablet, 1 tablet, Oral, Daily, Martha Santa, APRN - CNP, 1 tablet at 10/31/22 0843    potassium chloride (KLOR-CON M) extended release tablet 40 mEq, 40 mEq, Oral, PRN **OR** potassium bicarb-citric acid (EFFER-K) effervescent tablet 40 mEq, 40 mEq, Oral, PRN **OR** potassium chloride 10 mEq/100 mL IVPB (Peripheral Line), 10 mEq, IntraVENous, PRN, Martha Pratt, APRN - CNP    magnesium sulfate 2000 mg in 50 mL IVPB premix, 2,000 mg, IntraVENous, PRN, Cheryl Rena, APRN - CNP    nicotine (NICODERM CQ) 21 MG/24HR 1 patch, 1 patch, TransDERmal, Daily, Martha Jovant, APRN - CNP, 1 patch at 10/31/22 0857    sodium chloride (OCEAN, BABY AYR) 0.65 % nasal spray 1 spray, 1 spray, Each Nostril, Q4H PRN, Martha Pratt, APRN - CNP      Imaging:  XR PELVIS (1-2 VIEWS)    Result Date: 10/27/2022  No discernible acute osseous abnormality. Recommendation: Follow up as clinically indicated. Electronically Signed by Ladell Kayser MD at 27-Oct-2022 09:54:54 AM EST             CT HEAD WO CONTRAST    Result Date: 10/27/2022  1. Mild atrophic changes. 2.  Otherwise no acute intracranial abnormalities. Recommendation: Follow up as clinically indicated. All CT scans at this facility utilize dose modulation, iterative reconstruction, and/or weight based dosing when appropriate to reduce radiation dose to as low as reasonably achievable. Electronically Signed by Hussein Brandon MD at 27-Oct-2022 09:15:26 AM EST             CT CERVICAL SPINE WO CONTRAST    Result Date: 10/27/2022  1. No acute osseous abnormality. 2.  Degenerative changes as above. Recommendation: Follow up as clinically indicated. All CT scans at this facility utilize dose modulation, iterative reconstruction, and/or weight based dosing when appropriate to reduce radiation dose to as low as reasonably achievable.  Electronically Signed by Ladell Kayser MD at 27-Oct-2022 09:18:45 AM EST             XR CHEST PORTABLE    Result Date: 10/27/2022  No active pulmonary disease. Recommendation: Follow up as clinically indicated. Electronically Signed by Vincent Dhaliwal MD at 27-Oct-2022 09:42:49 AM EST                  Assessment/Plan  Principal Problem:    Alcohol intoxication in active alcoholic without complication ([facility] [address]  Active Problems:    Nicotine dependence  Resolved Problems:    * No resolved hospital problems. *     -- Discontinue CIWA protocol, patient should be through the window for withdrawal  -- Given 260 mg Phenobarbital x1 dose on 10/28  -- Give 130 mg phenobarbital on 10/29  -- PO thiamine  -- Inpatient alcohol rehab after discharge  -- Nicotine patch    Patient worked with PT today, still having some difficulty ambulating and generalized weakness. They plan to work with him again tomorrow. DVT prophylaxis: Lovenox    DISPOSITION: Discharge to inpatient alcohol rehab, likely tomorrow.     Full Code No additional code details        Natividad Gonzalez MD 10/31/2022 1:42 PM

## 2022-10-31 NOTE — PROGRESS NOTES
Physical Therapy  Facility/Department: [facility] 3 NAVNEET/VAS/MED  Physical Therapy Initial Assessment    Name: Eliceo Gregg  : 1975  MRN: [id number]  Date of Service: 10/31/2022    Discharge Recommendations:  Continue to assess pending progress, 24 hour supervision or assist, Patient would benefit from continued therapy after discharge          Patient Diagnosis(es): The primary encounter diagnosis was Depression, unspecified depression type. Diagnoses of Acute alcoholic intoxication without complication ([facility] [address] and Multiple falls were also pertinent to this visit. Past Medical History:  has a past medical history of Anxiety, Psychiatric problem, and Seizures ([facility] [address] Past Surgical History:  has a past surgical history that includes eye surgery and orthopedic surgery. Assessment   Body Structures, Functions, Activity Limitations Requiring Skilled Therapeutic Intervention: Decreased functional mobility ; Decreased strength;Decreased balance;Decreased body mechanics; Decreased coordination;Decreased fine motor control;Decreased cognition;Decreased safe awareness;Decreased posture  Assessment: Pt. will benefit from cont. PT to decrease impairments. Pt. a fall risk due to prior falls. Pt. reports falls today in the room. Declined bed alarm. Pt. instructed to use call light for when he needs to get up out of bed. Pt. instructed to use RW in room with staff A, but pt hesitant to do so. He states he needs to work on walking without it. Explained to pt that he just needs it right now, and it may not be for very long, but that he needs the added stability at this time. Would recommend he not go to rehab today due to fall risk if he is expected to be ambulatory on his own there.   Treatment Diagnosis: impaired gait and mobility  Therapy Prognosis: Poor  Decision Making: Medium Complexity  Barriers to Learning: decreased safety awareness  Requires PT Follow-Up: Yes  Activity Tolerance  Activity Tolerance: Patient limited by fatigue;Patient limited by endurance     Plan   Physcial Therapy Plan  General Plan: 3-5 times per week  Current Treatment Recommendations: Strengthening, Balance training, Functional mobility training, Transfer training, Endurance training, Gait training, Safety education & training, Positioning, Equipment evaluation, education, & procurement, Therapeutic activities, Patient/Caregiver education & training  Safety Devices  Type of Devices: Gait belt, Call light within reach, Left in bed, Patient at risk for falls, Nurse notified (seated on EOB working on paperwork, declined bed alarm)     Restrictions  Restrictions/Precautions  Restrictions/Precautions: Fall Risk, Seizure  Required Braces or Orthoses?: No     Subjective   Pain: none  General  Chart Reviewed: Yes  Patient assessed for rehabilitation services?: Yes  Response To Previous Treatment: Not applicable  Family / Caregiver Present: No  Referring Practitioner: Isela Serrano MD  Referral Date : 10/31/22  Diagnosis: acute alcohol intoxication, falls, depression  Follows Commands: Within Functional Limits  General Comment  Comments: RN, Chucho Payne PT. Subjective  Subjective: Pt. willing to walk in hallway. States he does not need the RW, but feels shaky. Reports 2 falls today. Social/Functional History  Social/Functional History  Lives With: Alone  Type of Home: House  Bathroom Toilet: Standard  Bathroom Equipment:  (denied use/need of bathroom dme)  Bathroom Accessibility: Accessible  Home Equipment:  (none)  Receives Help From: Family  ADL Assistance: Independent  Homemaking Assistance: Independent  Homemaking Responsibilities: Yes  Ambulation Assistance: Independent  Transfer Assistance: Independent  Active : Yes  Mode of Transportation: Car  Occupation: Unemployed  Type of Occupation: pt states \"I'm in a waiting period. filed for diability and waiting to hear back.  also applied for jobs and waiting on job offers\"  Vision/Hearing  Hearing  Hearing: Within functional limits    Cognition   Orientation  Overall Orientation Status: Within Functional Limits  Cognition  Overall Cognitive Status: Exceptions  Arousal/Alertness: Appropriate responses to stimuli  Following Commands:  Follows one step commands consistently  Attention Span: Attends with cues to redirect  Memory: Decreased recall of precautions  Safety Judgement: Decreased awareness of need for assistance;Decreased awareness of need for safety  Problem Solving: Assistance required to implement solutions;Assistance required to generate solutions  Insights: Decreased awareness of deficits  Initiation: Requires cues for some  Sequencing: Requires cues for some     Objective   Heart Rate: (!) 106  Heart Rate Source: Monitor  BP: 119/86  BP Location: Right upper arm  BP Method: Automatic  Patient Position: Supine  MAP (Calculated): 97  Resp: 18  SpO2: 98 %  O2 Device: None (Room air)     Observation/Palpation  Posture: Fair  Body Mechanics: knees stay slightly flexed        AROM RLE (degrees)  RLE AROM: WFL  AROM LLE (degrees)  LLE AROM : WFL  Strength RLE  Strength RLE: Exception  Comment: grossly 3+/5  Strength LLE  Strength LLE: Exception  Comment: grossly 3+/5           Bed mobility  Supine to Sit: Unable to assess  Sit to Supine: Unable to assess  Bed Mobility Comments: sitting up on EOB, filling out paperwork  Transfers  Sit to Stand: Minimal Assistance;Contact guard assistance  Stand to Sit: Minimal Assistance;Contact guard assistance  Comment: slow to stand  Ambulation  Surface: Level tile  Device: No Device  Assistance: Contact guard assistance;Minimal assistance  Quality of Gait: unsteady, tremors in B hands  Gait Deviations: Slow Opal;Decreased step length;Decreased step height;Shuffles  Distance: 140'  Comments: pt has losses of balance while turning     Balance  Posture: Good  Sitting - Static: Fair  Sitting - Dynamic: Fair;-  Standing - Static: Fair;-  Standing - Dynamic: Poor;+           OutComes Score                                                  AM-PAC Score             Tinneti Score       Goals  Short Term Goals  Time Frame for Short Term Goals: 2 wks  Short Term Goal 1: supine to sit indep  Short Term Goal 2: sit to stand indep  Short Term Goal 3: amb. 300' indep  Patient Goals   Patient Goals : go home       Education  Patient Education  Education Given To: Patient  Education Provided: Role of Therapy;Plan of Care;Transfer Training  Education Provided Comments: use of call light for staff A  Education Method: Verbal  Barriers to Learning: Cognition  Education Outcome: Verbalized understanding;Continued education needed      Therapy Time   Individual Concurrent Group Co-treatment   Time In           Time Out           Minutes                   Trupti Gallegos PT   Electronically signed by Trupti Gallegos PT on 10/31/2022 at 1:55 PM

## 2022-10-31 NOTE — CARE COORDINATION
SW placed call to [facility] to let them know Pt will likely come tomorrow and not today.   Electronically signed by JULIET Aviles on 10/31/2022 at 1:54 PM

## 2022-10-31 NOTE — PROGRESS NOTES
CLINICAL PHARMACY NOTE: MEDS TO BEDS    Total # of Prescriptions Filled: 1   The following medications were delivered to the patient: Thiamine 100 mg    Additional Documentation:   Delivered Rx's to patients room. Gave Rx's to patient. Patient paid $0.00 copay.

## 2022-11-01 VITALS
HEIGHT: 74 IN | WEIGHT: 195.2 LBS | RESPIRATION RATE: 18 BRPM | BODY MASS INDEX: 25.05 KG/M2 | HEART RATE: 104 BPM | OXYGEN SATURATION: 94 % | SYSTOLIC BLOOD PRESSURE: 135 MMHG | DIASTOLIC BLOOD PRESSURE: 101 MMHG | TEMPERATURE: 97.7 F

## 2022-11-01 LAB
ALBUMIN SERPL-MCNC: 3.4 G/DL (ref 3.5–5.2)
ALP BLD-CCNC: 137 U/L (ref 40–130)
ALT SERPL-CCNC: 72 U/L (ref 5–41)
ANION GAP SERPL CALCULATED.3IONS-SCNC: 10 MMOL/L (ref 7–19)
AST SERPL-CCNC: 76 U/L (ref 5–40)
BASOPHILS ABSOLUTE: 0.1 K/UL (ref 0–0.2)
BASOPHILS RELATIVE PERCENT: 1.1 % (ref 0–1)
BILIRUB SERPL-MCNC: <0.2 MG/DL (ref 0.2–1.2)
BUN BLDV-MCNC: 11 MG/DL (ref 6–20)
CALCIUM SERPL-MCNC: 8.8 MG/DL (ref 8.6–10)
CHLORIDE BLD-SCNC: 103 MMOL/L (ref 98–111)
CO2: 23 MMOL/L (ref 22–29)
CREAT SERPL-MCNC: 0.6 MG/DL (ref 0.5–1.2)
EOSINOPHILS ABSOLUTE: 0.1 K/UL (ref 0–0.6)
EOSINOPHILS RELATIVE PERCENT: 3.1 % (ref 0–5)
GFR SERPL CREATININE-BSD FRML MDRD: >60 ML/MIN/{1.73_M2}
GLUCOSE BLD-MCNC: 108 MG/DL (ref 74–109)
HCT VFR BLD CALC: 35.4 % (ref 42–52)
HEMOGLOBIN: 12.5 G/DL (ref 14–18)
IMMATURE GRANULOCYTES #: 0 K/UL
LYMPHOCYTES ABSOLUTE: 1.3 K/UL (ref 1.1–4.5)
LYMPHOCYTES RELATIVE PERCENT: 28.5 % (ref 20–40)
MCH RBC QN AUTO: 36.1 PG (ref 27–31)
MCHC RBC AUTO-ENTMCNC: 35.3 G/DL (ref 33–37)
MCV RBC AUTO: 102.3 FL (ref 80–94)
MONOCYTES ABSOLUTE: 0.7 K/UL (ref 0–0.9)
MONOCYTES RELATIVE PERCENT: 14.4 % (ref 0–10)
NEUTROPHILS ABSOLUTE: 2.4 K/UL (ref 1.5–7.5)
NEUTROPHILS RELATIVE PERCENT: 52 % (ref 50–65)
PDW BLD-RTO: 12.6 % (ref 11.5–14.5)
PLATELET # BLD: 156 K/UL (ref 130–400)
PMV BLD AUTO: 10.5 FL (ref 9.4–12.4)
POTASSIUM SERPL-SCNC: 4.1 MMOL/L (ref 3.5–5)
RBC # BLD: 3.46 M/UL (ref 4.7–6.1)
SODIUM BLD-SCNC: 136 MMOL/L (ref 136–145)
TOTAL PROTEIN: 5.5 G/DL (ref 6.6–8.7)
WBC # BLD: 4.6 K/UL (ref 4.8–10.8)

## 2022-11-01 PROCEDURE — 6370000000 HC RX 637 (ALT 250 FOR IP): Performed by: EMERGENCY MEDICINE

## 2022-11-01 PROCEDURE — 85025 COMPLETE CBC W/AUTO DIFF WBC: CPT

## 2022-11-01 PROCEDURE — 80053 COMPREHEN METABOLIC PANEL: CPT

## 2022-11-01 PROCEDURE — 2580000003 HC RX 258: Performed by: NURSE PRACTITIONER

## 2022-11-01 PROCEDURE — 36415 COLL VENOUS BLD VENIPUNCTURE: CPT

## 2022-11-01 PROCEDURE — 6370000000 HC RX 637 (ALT 250 FOR IP): Performed by: NURSE PRACTITIONER

## 2022-11-01 RX ADMIN — Medication 10 ML: at 08:02

## 2022-11-01 RX ADMIN — THERA TABS 1 TABLET: TAB at 08:02

## 2022-11-01 RX ADMIN — THIAMINE HCL TAB 100 MG 100 MG: 100 TAB at 08:02

## 2022-11-01 NOTE — PROGRESS NOTES
Per patient, he is discharging today by private vehicle to a 28-day ETOH rehabilitation program. Patient instructed to make PCP appointment for 2-weeks post-discharge from that facility. Appointment will not be made at this time by nursing staff.      Electronically signed by Julissa Hammond RN on 11/1/2022 at 10:47 AM

## 2022-11-01 NOTE — PROGRESS NOTES
Patient ambulated to nursing desk without difficulty to check on why Ativan was discontinued . Is wanting something for sleep tonight . Informed patient that Melatonin was ordered and would bring it to room .

## 2022-11-01 NOTE — DISCHARGE SUMMARY
Clarance Peabody  :  1975  MRN:  [id number]    Admit date:  10/27/2022 Discharge date:  2022    Discharging Physician:  Dr. Will Drew Directive: Full Code    Consults: [facility]     Primary Care Physician:  Mere Ornelas    Discharge Diagnoses:  Principal Problem:    Alcohol intoxication in active alcoholic without complication ([address]  Active Problems:    Nicotine dependence  Resolved Problems:    * No resolved hospital problems. *      Portions of this note have been copied forward, however, changed to reflect the most current clinical status of this patient. Hospital Course: The patient is a 49 y.o. male with past medical history of anxiety and seizures who presented to [address] [place] [address] ED complaining of alcohol intoxication. Patient reported to the ED indicating he had been drinking heavily recently. Patient states he would like rehab. Patient also indicated having multiple falls over the past few weeks. Patient denied any injury. On examination in the ED patient was noted to have multiple bruises scattered throughout his body in multiple stages of healing. Patient indicated depression but denied SI or HI. Patient denies hallucinations or delusions. Chart review indicates patient has history of seizures with alcohol withdrawal in the past. Admitted to hospitalist service for EtOH withdrawal and started on CIWA protocol. Given 260 mg Phenobarbital IV on 10/28. Patient will transfer to inpatient rehab at [facility] after discharge. Labs trending stable. Patient is stable for discharge at this time. Significant Diagnostic Studies:   XR PELVIS (1-2 VIEWS)    Result Date: 10/27/2022  NO PRIOR REPORT AVAILABLE Exam:X-RAY OF THE PELVIS Clinical data:Multiple falls. Technique:Single frontal view of the pelvis. Prior studies: No prior studies submitted. Findings: Single view of the pelvis demonstrates intact proximal right and left femurs.  The acetabular joints,sacroiliac joints, and subluxation. Bone mineralization is grossly unremarkable. Vertebral body heights are maintained. Posterior elements are intact. Straightening of the physiologic cervical lordosis. Scattered vascular calcifications. Multilevel facet arthropathic changes; right greater than left. Moderate disc space narrowing with a 3.5 mm posterior bulging disc at C5-C6. No CT evidence of bony spinal canal or neural foramen stenosis. Skull base and craniocervical junction are intact. Lung apices are clear. 1.  No acute osseous abnormality. 2.  Degenerative changes as above. Recommendation: Follow up as clinically indicated. All CT scans at this facility utilize dose modulation, iterative reconstruction, and/or weight based dosing when appropriate to reduce radiation dose to as low as reasonably achievable. Electronically Signed by Baljit Harman MD at 27-Oct-2022 09:18:45 AM EST             XR CHEST PORTABLE    Result Date: 10/27/2022  NO PRIOR REPORT AVAILABLE Exam: X-RAY OF [facility] Clinical data:Chest pain. Technique:Single view of the chest. Prior studies: No prior studies submitted. Findings: The trachea is midline. The cardiac, mediastinal and pulmonary vascular shadows are normal.  There is no focal consolidation or effusion. The osseous structures are intact. No active pulmonary disease. Recommendation: Follow up as clinically indicated. Electronically Signed by Dex Ortiz MD at 27-Oct-2022 09:42:49 AM EST               Pertinent Labs:   CBC:   Recent Labs     10/30/22  0411 10/31/22  0258 11/01/22  0317   WBC 4.2* 4.5* 4.6*   HGB 12.6* 12.4* 12.5*   * 143 156     BMP:    Recent Labs     10/30/22  0411 10/31/22  0258 11/01/22  0317   * 136 136   K 3.5 3.5 4.1   CL 99 102 103   CO2 24 23 23   BUN 8 8 11   CREATININE 0.5 0.5 0.6   GLUCOSE 91 93 108     INR: No results for input(s): INR in the last 72 hours.     Physical Exam:  Vital Signs: /78   Pulse 62   Temp 97.7 °F (36.5 °C) (Oral) Resp 18   Ht 6' 2\" (1.88 m)   Wt 195 lb 3.2 oz (88.5 kg)   SpO2 96%   BMI 25.06 kg/m²   Physical Exam  Vitals and nursing note reviewed. Constitutional:       Appearance: Normal appearance. HENT:      Head: Normocephalic. Nose: Nose normal.   Eyes:      Pupils: Pupils are equal, round, and reactive to light. Cardiovascular:      Rate and Rhythm: Normal rate and regular rhythm. Pulses: Normal pulses. Heart sounds: Normal heart sounds. Pulmonary:      Effort: Pulmonary effort is normal.      Breath sounds: Normal breath sounds. Abdominal:      General: There is no distension. Palpations: Abdomen is soft. Tenderness: There is no abdominal tenderness. Musculoskeletal:         General: Normal range of motion. Cervical back: Normal range of motion. Skin:     General: Skin is warm and dry. Capillary Refill: Capillary refill takes less than 2 seconds. Neurological:      Mental Status: He is alert and oriented to person, place, and time. Psychiatric:         Mood and Affect: Mood normal.         Behavior: Behavior normal.      Comments: Negative for thoughts of self harm       Discharge Medications:         Medication List        START taking these medications      vitamin B-1 100 MG tablet  Commonly known as: THIAMINE  Take 1 tablet by mouth daily            CONTINUE taking these medications      therapeutic multivitamin-minerals tablet               Where to Get Your Medications        These medications were sent to [facility], [address]  [address], [address]      Phone: [phone]   vitamin B-1 100 MG tablet         Discharge Instructions: Follow up with Ian Bowens in 7 days. Take medications as directed. Resume activity as tolerated. Diet: ADULT DIET; Regular     Disposition: Patient is  Stable  and will be discharged to Inpatient Psych.     Time spent on discharge  35  minutes spent in assessing patient, reviewing medications, discussion with nursing, confirming safe discharge plan and preparation of discharge summary.     Signed:  Tai Sites Lissette, TIBURCIO - CNP, 11/1/2022 10:35 AM

## 2022-11-03 ENCOUNTER — HOSPITAL ENCOUNTER (EMERGENCY)
Age: 47
Discharge: HOME | End: 2022-11-03
Attending: EMERGENCY MEDICINE
Payer: MEDICAID

## 2022-11-03 VITALS
WEIGHT: 197 LBS | RESPIRATION RATE: 20 BRPM | HEART RATE: 78 BPM | SYSTOLIC BLOOD PRESSURE: 132 MMHG | TEMPERATURE: 98 F | DIASTOLIC BLOOD PRESSURE: 78 MMHG | OXYGEN SATURATION: 99 % | HEIGHT: 74 IN | BODY MASS INDEX: 25.28 KG/M2

## 2022-11-03 DIAGNOSIS — F10.930 ALCOHOL WITHDRAWAL SEIZURE WITHOUT COMPLICATION (HCC): Primary | ICD-10-CM

## 2022-11-03 DIAGNOSIS — R56.9 ALCOHOL WITHDRAWAL SEIZURE WITHOUT COMPLICATION (HCC): Primary | ICD-10-CM

## 2022-11-03 LAB
ALBUMIN SERPL-MCNC: 4.3 G/DL (ref 3.5–5.2)
ALP BLD-CCNC: 128 U/L (ref 40–130)
ALT SERPL-CCNC: 110 U/L (ref 5–41)
AMPHETAMINE SCREEN, URINE: NEGATIVE
ANION GAP SERPL CALCULATED.3IONS-SCNC: 14 MMOL/L (ref 7–19)
AST SERPL-CCNC: 74 U/L (ref 5–40)
BACTERIA: NEGATIVE /HPF
BARBITURATE SCREEN URINE: POSITIVE
BENZODIAZEPINE SCREEN, URINE: POSITIVE
BILIRUB SERPL-MCNC: 0.3 MG/DL (ref 0.2–1.2)
BILIRUBIN URINE: NEGATIVE
BLOOD, URINE: NEGATIVE
BUN BLDV-MCNC: 17 MG/DL (ref 6–20)
BUPRENORPHINE URINE: NEGATIVE
CALCIUM SERPL-MCNC: 9.5 MG/DL (ref 8.6–10)
CANNABINOID SCREEN URINE: NEGATIVE
CHLORIDE BLD-SCNC: 99 MMOL/L (ref 98–111)
CLARITY: CLEAR
CO2: 22 MMOL/L (ref 22–29)
COCAINE METABOLITE SCREEN URINE: NEGATIVE
COLOR: YELLOW
CREAT SERPL-MCNC: 1.1 MG/DL (ref 0.5–1.2)
CRYSTALS, UA: ABNORMAL /HPF
EPITHELIAL CELLS, UA: 1 /HPF (ref 0–5)
ETHANOL: <10 MG/DL (ref 0–0.08)
GFR SERPL CREATININE-BSD FRML MDRD: >60 ML/MIN/{1.73_M2}
GLUCOSE BLD-MCNC: 142 MG/DL (ref 74–109)
GLUCOSE URINE: NEGATIVE MG/DL
HCT VFR BLD CALC: 36.8 % (ref 42–52)
HEMOGLOBIN: 12.5 G/DL (ref 14–18)
HYALINE CASTS: 2 /HPF (ref 0–8)
KETONES, URINE: ABNORMAL MG/DL
LEUKOCYTE ESTERASE, URINE: NEGATIVE
Lab: ABNORMAL
MCH RBC QN AUTO: 35.4 PG (ref 27–31)
MCHC RBC AUTO-ENTMCNC: 34 G/DL (ref 33–37)
MCV RBC AUTO: 104.2 FL (ref 80–94)
METHADONE SCREEN, URINE: NEGATIVE
METHAMPHETAMINE, URINE: NEGATIVE
NITRITE, URINE: NEGATIVE
OPIATE SCREEN URINE: NEGATIVE
OXYCODONE URINE: NEGATIVE
PDW BLD-RTO: 13.2 % (ref 11.5–14.5)
PH UA: 5.5 (ref 5–8)
PHENCYCLIDINE SCREEN URINE: NEGATIVE
PLATELET # BLD: 237 K/UL (ref 130–400)
PMV BLD AUTO: 10 FL (ref 9.4–12.4)
POTASSIUM REFLEX MAGNESIUM: 4.4 MMOL/L (ref 3.5–5)
PROPOXYPHENE SCREEN: NEGATIVE
PROTEIN UA: 100 MG/DL
RBC # BLD: 3.53 M/UL (ref 4.7–6.1)
RBC UA: 3 /HPF (ref 0–4)
SODIUM BLD-SCNC: 135 MMOL/L (ref 136–145)
SPECIFIC GRAVITY UA: 1.03 (ref 1–1.03)
TOTAL PROTEIN: 7.1 G/DL (ref 6.6–8.7)
TRICYCLIC, URINE: NEGATIVE
TROPONIN: <0.01 NG/ML (ref 0–0.03)
UROBILINOGEN, URINE: 1 E.U./DL
WBC # BLD: 5.8 K/UL (ref 4.8–10.8)
WBC UA: 5 /HPF (ref 0–5)

## 2022-11-03 PROCEDURE — 84484 ASSAY OF TROPONIN QUANT: CPT

## 2022-11-03 PROCEDURE — 90715 TDAP VACCINE 7 YRS/> IM: CPT | Performed by: EMERGENCY MEDICINE

## 2022-11-03 PROCEDURE — 80053 COMPREHEN METABOLIC PANEL: CPT

## 2022-11-03 PROCEDURE — 99284 EMERGENCY DEPT VISIT MOD MDM: CPT

## 2022-11-03 PROCEDURE — 6360000002 HC RX W HCPCS: Performed by: EMERGENCY MEDICINE

## 2022-11-03 PROCEDURE — 85027 COMPLETE CBC AUTOMATED: CPT

## 2022-11-03 PROCEDURE — 36415 COLL VENOUS BLD VENIPUNCTURE: CPT

## 2022-11-03 PROCEDURE — 93005 ELECTROCARDIOGRAM TRACING: CPT | Performed by: EMERGENCY MEDICINE

## 2022-11-03 PROCEDURE — 90471 IMMUNIZATION ADMIN: CPT | Performed by: EMERGENCY MEDICINE

## 2022-11-03 PROCEDURE — 81001 URINALYSIS AUTO W/SCOPE: CPT

## 2022-11-03 PROCEDURE — 82077 ASSAY SPEC XCP UR&BREATH IA: CPT

## 2022-11-03 PROCEDURE — 80306 DRUG TEST PRSMV INSTRMNT: CPT

## 2022-11-03 RX ORDER — PHENOL 1.4 %
AEROSOL, SPRAY (ML) MUCOUS MEMBRANE NIGHTLY
COMMUNITY

## 2022-11-03 RX ADMIN — TETANUS TOXOID, REDUCED DIPHTHERIA TOXOID AND ACELLULAR PERTUSSIS VACCINE, ADSORBED 0.5 ML: 5; 2.5; 8; 8; 2.5 SUSPENSION INTRAMUSCULAR at 20:20

## 2022-11-03 ASSESSMENT — ENCOUNTER SYMPTOMS
ABDOMINAL PAIN: 0
SHORTNESS OF BREATH: 0
VOMITING: 0
DIARRHEA: 0
EYE PAIN: 0

## 2022-11-03 ASSESSMENT — PAIN - FUNCTIONAL ASSESSMENT: PAIN_FUNCTIONAL_ASSESSMENT: NONE - DENIES PAIN

## 2022-11-03 NOTE — LETTER
[address] [facility]  [address]  Phone: [phone]    Patient: Eb Chavez  YOB: 1975  Date: 11/3/2022 Time: 8:36 PM    Leaving the Hospital Against Medical Advice    Chart #:[id number]    This will certify that I, the undersigned,    ______________________________________________________________________    A patient in the above named medical center, having requested discharge and removal from the medical center against the advice of my attending physician(s), hereby release the Emergency Department, its physicians, officers and employees, severally and individually, from any and all liability of any nature whatsoever for any injury or harm or complication of any kind that may result directly or indirectly, by reason of my terminating my stay as a patient from [facility], and hereby waive any and all rights of action I may now have or later acquire as a result of my voluntary departure from [facility] and the termination of my stay as a patient therein. This release is made with the full knowledge of the danger that may result from the action which I am taking.       Date:_______________________                         ___________________________                                                                                    Patient/Legal Representative    Witness:        ____________________________                          ___________________________  Nurse                                                                        Physician

## 2022-11-04 LAB
EKG P AXIS: 68 DEGREES
EKG P-R INTERVAL: 152 MS
EKG Q-T INTERVAL: 346 MS
EKG QRS DURATION: 84 MS
EKG QTC CALCULATION (BAZETT): 400 MS
EKG T AXIS: 29 DEGREES

## 2022-11-04 NOTE — ED NOTES
Patient placed in a gown Patient placed on cardiac monitor, continuous pulse oximeter, and NIBP monitor.  Monitor alarms on.       Rosalinda Haque RN  11/03/22 1266

## 2022-11-04 NOTE — ED PROVIDER NOTES
[facility] EMERGENCY DEPT  eMERGENCY dEPARTMENT eNCOUnter      Pt Name: Eliceo Gregg  MRN: [id number]  [place] 1975  Date of evaluation: 11/3/2022  Provider: Aaron Hairston MD    CHIEF COMPLAINT       Chief Complaint   Patient presents with    Seizures     Witnesses state had a seizure approximately 60 secs         HISTORY OF PRESENT ILLNESS   (Location/Symptom, Timing/Onset,Context/Setting, Quality, Duration, Modifying Factors, Severity)  Note limiting factors. Eliceo Gregg is a 49 y.o. male who presents to the emergency department following seizure. Patient tells me that he has a history of prior alcohol abuse and has not had a drink in about 2 weeks. Tells me he is currently at inpatient rehab through [place] [address]. After AA meeting patient was outside smoking and sitting in a chair. Patient said he felt funny and then had a seizure. This was witnessed. Did not fall from the chair. No injuries. Did bite the left side of his tongue but other than that no injuries. Has no complaints at this time. No headache or neck or back pain. No vision changes numbness or weakness. No chest pain or abdominal pain. No nausea vomiting diarrhea. Again, asymptomatic now. Patient does have history of prior alcohol withdrawal seizure. Patient tells me he has not had any alcohol to drink in approximately 2 weeks. HPI    NursingNotes were reviewed. REVIEW OF SYSTEMS    (2-9 systems for level 4, 10 or more for level 5)     Review of Systems   Constitutional:  Negative for fever. Eyes:  Negative for pain. Respiratory:  Negative for shortness of breath. Cardiovascular:  Negative for chest pain and palpitations. Gastrointestinal:  Negative for abdominal pain, diarrhea and vomiting. Genitourinary:  Negative for dysuria. Skin:  Negative for rash. Neurological:  Positive for seizures. Negative for weakness and headaches. Psychiatric/Behavioral:  Negative for suicidal ideas.     All other systems reviewed and are negative. A complete review of systems was performed and is negative except as noted above in the HPI. PAST MEDICAL HISTORY     Past Medical History:   Diagnosis Date    Alcohol abuse     Anxiety     Psychiatric problem     Seizures ([address]          SURGICAL HISTORY       Past Surgical History:   Procedure Laterality Date    EYE SURGERY      ORTHOPEDIC SURGERY           CURRENT MEDICATIONS       Discharge Medication List as of 11/3/2022  8:26 PM        CONTINUE these medications which have NOT CHANGED    Details   Melatonin 10 MG TABS Take by mouth nightlyHistorical Med      thiamine mononitrate (THIAMINE) 100 MG tablet Take 1 tablet by mouth daily, Disp-30 tablet, R-0Normal      Multiple Vitamins-Minerals (THERAPEUTIC MULTIVITAMIN-MINERALS) tablet Take 1 tablet by mouth dailyHistorical Med             ALLERGIES     Bactrim [sulfamethoxazole-trimethoprim]    FAMILY HISTORY     History reviewed. No pertinent family history. SOCIAL HISTORY       Social History     Socioeconomic History    Marital status:      Spouse name: None    Number of children: None    Years of education: None    Highest education level: None   Tobacco Use    Smoking status: Every Day     Types: Cigars    Smokeless tobacco: Never   Vaping Use    Vaping Use: Never used   Substance and Sexual Activity    Alcohol use: Yes     Comment: 2 pints of whiskey daily    Drug use: Never       SCREENINGS    Linda Coma Scale  Eye Opening: Spontaneous  Best Verbal Response: Oriented  Best Motor Response: Obeys commands  Linda Coma Scale Score: 15        PHYSICAL EXAM    (up to 7 for level 4, 8 or more for level 5)     ED Triage Vitals [11/03/22 1903]   BP Temp Temp src Heart Rate Resp SpO2 Height Weight   137/82 98.9 °F (37.2 °C) -- 90 20 98 % 6' 2\" (1.88 m) 197 lb (89.4 kg)       Physical Exam  Vitals reviewed. Constitutional:       General: He is not in acute distress. Appearance: He is well-developed.    HENT: Head: Normocephalic and atraumatic. Right Ear: Tympanic membrane, ear canal and external ear normal.      Left Ear: Tympanic membrane, ear canal and external ear normal.      Nose: Nose normal.      Mouth/Throat:      Mouth: Mucous membranes are moist.      Pharynx: Oropharynx is clear. Eyes:      General: No scleral icterus. Right eye: No discharge. Left eye: No discharge. Extraocular Movements: Extraocular movements intact. Conjunctiva/sclera: Conjunctivae normal.      Pupils: Pupils are equal, round, and reactive to light. Neck:      Vascular: No JVD. Cardiovascular:      Rate and Rhythm: Normal rate and regular rhythm. Heart sounds: Normal heart sounds. Pulmonary:      Effort: Pulmonary effort is normal. No respiratory distress. Breath sounds: Normal breath sounds. Abdominal:      General: There is no distension. Palpations: Abdomen is soft. Tenderness: There is no abdominal tenderness. Musculoskeletal:         General: No tenderness or deformity. Normal range of motion. Cervical back: Normal range of motion and neck supple. No tenderness. Skin:     General: Skin is warm and dry. Capillary Refill: Capillary refill takes less than 2 seconds. Neurological:      General: No focal deficit present. Mental Status: He is alert and oriented to person, place, and time. Cranial Nerves: Cranial nerves 2-12 are intact. Sensory: Sensation is intact. Motor: Tremor (very mild) present. Coordination: Coordination is intact. Psychiatric:         Mood and Affect: Mood normal.         Behavior: Behavior normal.       DIAGNOSTIC RESULTS     EKG: All EKG's are interpreted by the Emergency Department Physician who either signs or Co-signs this chart in the absence of a cardiologist.    Normal sinus rhythm. Normal QT. Mild artifact.     RADIOLOGY:   Non-plain film images such as CT, Ultrasound and MRI are read by the radiologist. Heidi Mary images are visualized and preliminarily interpreted by the emergency physician with the below findings:        Interpretation per the Radiologist below, if available at the time of this note:    No orders to display         ED BEDSIDE ULTRASOUND:   Performed by ED Physician - none    LABS:  Labs Reviewed   CBC - Abnormal; Notable for the following components:       Result Value    RBC 3.53 (*)     Hemoglobin 12.5 (*)     Hematocrit 36.8 (*)     .2 (*)     MCH 35.4 (*)     All other components within normal limits   COMPREHENSIVE METABOLIC PANEL W/ REFLEX TO MG FOR LOW K - Abnormal; Notable for the following components:    Sodium 135 (*)     Glucose 142 (*)      (*)     AST 74 (*)     All other components within normal limits   DRUG SCRN, BUPRENORPHINE - Abnormal; Notable for the following components:    Barbiturate Screen, Ur POSITIVE (*)     Benzodiazepine Screen, Urine POSITIVE (*)     All other components within normal limits   URINALYSIS WITH REFLEX TO CULTURE - Abnormal; Notable for the following components:    Ketones, Urine TRACE (*)     Protein,  (*)     All other components within normal limits   MICROSCOPIC URINALYSIS - Abnormal; Notable for the following components:    Bacteria, UA NEGATIVE (*)     Crystals, UA NEG (*)     All other components within normal limits   ETHANOL   TROPONIN       All other labs were within normal range or not returned as of this dictation. EMERGENCY DEPARTMENT COURSE and DIFFERENTIALDIAGNOSIS/MDM:   Vitals:    Vitals:    11/03/22 1903 11/03/22 1907 11/03/22 2000 11/03/22 2027   BP: 137/82  134/88 132/78   Pulse: 90 92 78 78   Resp: 20  20 20   Temp: 98.9 °F (37.2 °C)   98 °F (36.7 °C)   SpO2: 98%  99% 99%   Weight: 197 lb (89.4 kg)      Height: 6' 2\" (1.88 m)          MDM  Patient remains asymptomatic. Upon review of records looks like patient was just discharged from the hospital yesterday.   While hospitalized he was given Ativan and phenobarbital which explains the results of his drug screen. After he was discharged yesterday he was transferred to [facility] rehab facility. Patient tells me that he has refused any medications since being at [facility]. Told him I suspect that his seizure today was probably due to alcohol withdrawal.  He seems a little shaky at this time but does not seem to be in any severe withdrawal.  Told patient that I would like to give him a dose of Ativan here and then call and speak with ajit to make sure they are comfortable with patient being transferred back to their facility for them to manage his withdrawal symptoms. If they are not, told patient that I feel like we would need to admit him here to manage his withdrawal symptoms. Patient refused any further treatment and refused to allow me to speak with ajit. Patient now wanting to leave. He will sign out AMA. No indication at this time the patient is a danger to self or others and no indication that he is not competent to make his own decisions so he will sign out AMA. CONSULTS:  None    PROCEDURES:  Unless otherwise notedbelow, none     Procedures    FINAL IMPRESSION     1.  Alcohol withdrawal seizure without complication [facility])          DISPOSITION/PLAN   DISPOSITION Merrill 11/03/2022 08:23:43 PM      PATIENT REFERRED TO:  @FUP@    DISCHARGE MEDICATIONS:  Discharge Medication List as of 11/3/2022  8:26 PM             (Please note that portions of this note were completed with a voice recognition program.  Efforts were made to edit the dictations butoccasionally words are mis-transcribed.)    Aaron Hairston MD (electronically signed)  AttendingEmerMercy Hospital Hot Springs Physician          Aaron Hairston MD  11/03/22 2947

## 2022-11-04 NOTE — DISCHARGE INSTR - COC
Continuity of Care Form    Patient Name: Jasmin Mosher   :  1975  MRN:  [id number]    Admit date:  11/3/2022  Discharge date:  ***    Code Status Order: Prior   Advance Directives:     Admitting Physician:  No admitting provider for patient encounter. PCP: Vandana Vega    Discharging Nurse: [facility] Unit/Room#: A  Discharging Unit Phone Number: ***    Emergency Contact:   Extended Emergency Contact Information  Primary Emergency Contact: [address] [place] [address] Phone: [phone]  Relation: Parent   needed?  No    Past Surgical History:  Past Surgical History:   Procedure Laterality Date    EYE SURGERY      ORTHOPEDIC SURGERY         Immunization History:   Immunization History   Administered Date(s) Administered    Tdap (Boostrix, Adacel) 2022       Active Problems:  Patient Active Problem List   Diagnosis Code    Alcohol withdrawal seizure with complication (HCC) H80.647, R56.9    KATHLEEN (generalized anxiety disorder) F41.1    NICHOLAS (acute kidney injury) ([facility] [address] [address] N17.9    Transaminitis R74.01    Alcohol intoxication in active alcoholic without complication ([facility] [address] [address] B34.697    Nicotine dependence F17.200       Isolation/Infection:   Isolation            No Isolation          Patient Infection Status       None to display            Nurse Assessment:  Last Vital Signs: /78   Pulse 78   Temp 98 °F (36.7 °C)   Resp 20   Ht 6' 2\" (1.88 m)   Wt 197 lb (89.4 kg)   SpO2 99%   BMI 25.29 kg/m²     Last documented pain score (0-10 scale):    Last Weight:   Wt Readings from Last 1 Encounters:   22 197 lb (89.4 kg)     Mental Status:  {IP PT MENTAL STATUS:07024}    IV Access:  { STEPHEN IV ACCESS:976868715}    Nursing Mobility/ADLs:  Walking   {CHP DME EELY:757980965}  Transfer  {CHP DME DIOR:057672669}  Bathing  {CHP DME SRMO:433424440}  Dressing  {CHP DME JWSV:809469473}  Toileting  {CHP DME YSBX:194201740}  Feeding  {CHP DME PPH}  Med Admin  {CHP DME XXPJ:586409217}  Med Delivery   [address] STEPHEN MED Delivery:666542271}    Wound Care Documentation and Therapy:        Elimination:  Continence: Bowel: {YES / WI:53493}  Bladder: {YES / DF:68859}  Urinary Catheter: {Urinary Catheter:267041022}   Colostomy/Ileostomy/Ileal Conduit: {YES / IA:56042}       Date of Last BM: ***  No intake or output data in the 24 hours ending 22  No intake/output data recorded.     Safety Concerns:     [address] STEPHEN Safety Concerns:889439677}    Impairments/Disabilities:      [address] [facility] Impairments/Disabilities:815266122}    Nutrition Therapy:  Current Nutrition Therapy:   [address] [facility] Diet List:389520318}    Routes of Feeding: {CHP DME Other Feedings:629681600}  Liquids: {Slp liquid thickness:80108}  Daily Fluid Restriction: {CHP DME Yes amt example:718278495}  Last Modified Barium Swallow with Video (Video Swallowing Test): {Done Not Done MCYZ:095013087}    Treatments at the Time of Hospital Discharge:   Respiratory Treatments: ***  Oxygen Therapy:  {Therapy; copd oxygen:55205}  Ventilator:    { CC Vent ICDI:234349783}    Rehab Therapies: {THERAPEUTIC INTERVENTION:5124351294}  Weight Bearing Status/Restrictions: [address]  Weight Bearin}  Other Medical Equipment (for information only, NOT a DME order):  {EQUIPMENT:307157189}  Other Treatments: ***    Patient's personal belongings (please select all that are sent with patient):  {P DME Belongings:401976726}    RN SIGNATURE:  {Esignature:656991506}    CASE MANAGEMENT/SOCIAL WORK SECTION    Inpatient Status Date: ***    Readmission Risk Assessment Score:  Readmission Risk              Risk of Unplanned Readmission:  0           Discharging to Facility/ Agency   Name:   Address:  Phone:  Fax:    Dialysis Facility (if applicable)   Name:  Address:  Dialysis Schedule:  Phone:  Fax:    / signature: {Esignature:225929465}    PHYSICIAN SECTION    Prognosis: {Prognosis:1764207710}    Condition at Discharge: [address] Patient

## 2023-06-27 NOTE — PROGRESS NOTES
Patient less agitated at present time continues to be confused to surroundings . CIWA at 10 at present time. Patient is drowsy and starting to fall asleep so Ativan held at this time . Patient has already received multiple 4mg doses this shift . Sitter remains at bedside for patient safety. Detail Level: Detailed Otc Regimen: Forti5 Initiate Treatment: Apply compound solution to scalp once daily

## 2023-10-15 ENCOUNTER — APPOINTMENT (OUTPATIENT)
Dept: CT IMAGING | Age: 48
End: 2023-10-15
Payer: MEDICAID

## 2023-10-15 ENCOUNTER — ANESTHESIA (OUTPATIENT)
Dept: OPERATING ROOM | Age: 48
End: 2023-10-15
Payer: MEDICAID

## 2023-10-15 ENCOUNTER — HOSPITAL ENCOUNTER (INPATIENT)
Age: 48
LOS: 6 days | Discharge: LEFT BEFORE DISCHARGE | End: 2023-10-21
Attending: EMERGENCY MEDICINE | Admitting: INTERNAL MEDICINE
Payer: MEDICAID

## 2023-10-15 ENCOUNTER — ANESTHESIA EVENT (OUTPATIENT)
Dept: OPERATING ROOM | Age: 48
End: 2023-10-15
Payer: MEDICAID

## 2023-10-15 DIAGNOSIS — I62.00 SUBDURAL HEMORRHAGE (HCC): ICD-10-CM

## 2023-10-15 DIAGNOSIS — S06.4X0A INTRACRANIAL EPIDURAL HEMATOMA (HCC): Primary | ICD-10-CM

## 2023-10-15 PROBLEM — S06.5XAA SUBDURAL HEMATOMA (HCC): Status: ACTIVE | Noted: 2023-10-15

## 2023-10-15 LAB
ABO + RH BLD: NORMAL
ALBUMIN SERPL-MCNC: 4.8 G/DL (ref 3.5–5.2)
ALP SERPL-CCNC: 89 U/L (ref 40–130)
ALT SERPL-CCNC: 18 U/L (ref 5–41)
AMMONIA PLAS-SCNC: 15 UMOL/L (ref 16–60)
AMPHET UR QL SCN: NEGATIVE
ANION GAP SERPL CALCULATED.3IONS-SCNC: 18 MMOL/L (ref 7–19)
APAP SERPL-MCNC: <5 UG/ML (ref 10–30)
APTT PPP: 25.4 SEC (ref 26–36.2)
AST SERPL-CCNC: 32 U/L (ref 5–40)
BACTERIA URNS QL MICRO: NEGATIVE /HPF
BARBITURATES UR QL SCN: NEGATIVE
BASOPHILS # BLD: 0 K/UL (ref 0–0.2)
BASOPHILS NFR BLD: 0.2 % (ref 0–1)
BENZODIAZ UR QL SCN: NEGATIVE
BILIRUB SERPL-MCNC: 0.4 MG/DL (ref 0.2–1.2)
BILIRUB UR QL STRIP: NEGATIVE
BLD GP AB SCN SERPL QL: NORMAL
BLOOD BANK DISPENSE STATUS: NORMAL
BLOOD BANK PRODUCT CODE: NORMAL
BPU ID: NORMAL
BUN SERPL-MCNC: 12 MG/DL (ref 6–20)
BUPRENORPHINE URINE: NEGATIVE
CALCIUM SERPL-MCNC: 9.1 MG/DL (ref 8.6–10)
CANNABINOIDS UR QL SCN: POSITIVE
CHLORIDE SERPL-SCNC: 98 MMOL/L (ref 98–111)
CK SERPL-CCNC: 474 U/L (ref 39–308)
CLARITY UR: CLEAR
CO2 SERPL-SCNC: 25 MMOL/L (ref 22–29)
COCAINE UR QL SCN: NEGATIVE
COLOR UR: ABNORMAL
CREAT SERPL-MCNC: 0.6 MG/DL (ref 0.5–1.2)
CRYSTALS URNS MICRO: ABNORMAL /HPF
DESCRIPTION BLOOD BANK: NORMAL
DRUG SCREEN COMMENT UR-IMP: ABNORMAL
EOSINOPHIL # BLD: 0 K/UL (ref 0–0.6)
EOSINOPHIL NFR BLD: 0 % (ref 0–5)
EPI CELLS #/AREA URNS AUTO: 4 /HPF (ref 0–5)
ERYTHROCYTE [DISTWIDTH] IN BLOOD BY AUTOMATED COUNT: 13.2 % (ref 11.5–14.5)
ETHANOLAMINE SERPL-MCNC: 249 MG/DL (ref 0–0.08)
FENTANYL SCREEN, URINE: NEGATIVE
GLUCOSE BLD-MCNC: 140 MG/DL (ref 70–99)
GLUCOSE SERPL-MCNC: 134 MG/DL (ref 74–109)
GLUCOSE UR STRIP.AUTO-MCNC: NEGATIVE MG/DL
HCT VFR BLD AUTO: 44.8 % (ref 42–52)
HGB BLD-MCNC: 15.8 G/DL (ref 14–18)
HGB UR STRIP.AUTO-MCNC: ABNORMAL MG/L
HYALINE CASTS #/AREA URNS AUTO: 4 /HPF (ref 0–8)
IMM GRANULOCYTES # BLD: 0.1 K/UL
INR PPP: 0.91 (ref 0.88–1.18)
KETONES UR STRIP.AUTO-MCNC: 15 MG/DL
LEUKOCYTE ESTERASE UR QL STRIP.AUTO: NEGATIVE
LYMPHOCYTES # BLD: 0.9 K/UL (ref 1.1–4.5)
LYMPHOCYTES NFR BLD: 5.4 % (ref 20–40)
MCH RBC QN AUTO: 34.9 PG (ref 27–31)
MCHC RBC AUTO-ENTMCNC: 35.3 G/DL (ref 33–37)
MCV RBC AUTO: 98.9 FL (ref 80–94)
METHADONE UR QL SCN: NEGATIVE
METHAMPHETAMINE, URINE: NEGATIVE
MONOCYTES # BLD: 0.7 K/UL (ref 0–0.9)
MONOCYTES NFR BLD: 3.9 % (ref 0–10)
NEUTROPHILS # BLD: 15.6 K/UL (ref 1.5–7.5)
NEUTS SEG NFR BLD: 90 % (ref 50–65)
NITRITE UR QL STRIP.AUTO: NEGATIVE
OPIATES UR QL SCN: NEGATIVE
OXYCODONE UR QL SCN: NEGATIVE
PCP UR QL SCN: NEGATIVE
PERFORMED ON: ABNORMAL
PH UR STRIP.AUTO: 5.5 [PH] (ref 5–8)
PLATELET # BLD AUTO: 166 K/UL (ref 130–400)
PMV BLD AUTO: 9.9 FL (ref 9.4–12.4)
POTASSIUM SERPL-SCNC: 4.4 MMOL/L (ref 3.5–5)
PROT SERPL-MCNC: 7.5 G/DL (ref 6.6–8.7)
PROT UR STRIP.AUTO-MCNC: 100 MG/DL
PROTHROMBIN TIME: 12 SEC (ref 12–14.6)
RBC # BLD AUTO: 4.53 M/UL (ref 4.7–6.1)
RBC #/AREA URNS AUTO: 4 /HPF (ref 0–4)
SALICYLATES SERPL-MCNC: <0.3 MG/DL (ref 3–10)
SODIUM SERPL-SCNC: 141 MMOL/L (ref 136–145)
SP GR UR STRIP.AUTO: 1.04 (ref 1–1.03)
TRICYCLIC, URINE: NEGATIVE
UROBILINOGEN UR STRIP.AUTO-MCNC: 1 E.U./DL
WBC # BLD AUTO: 17.3 K/UL (ref 4.8–10.8)
WBC #/AREA URNS AUTO: 1 /HPF (ref 0–5)

## 2023-10-15 PROCEDURE — 80179 DRUG ASSAY SALICYLATE: CPT

## 2023-10-15 PROCEDURE — 36415 COLL VENOUS BLD VENIPUNCTURE: CPT

## 2023-10-15 PROCEDURE — 72125 CT NECK SPINE W/O DYE: CPT

## 2023-10-15 PROCEDURE — 2500000003 HC RX 250 WO HCPCS: Performed by: NEUROLOGICAL SURGERY

## 2023-10-15 PROCEDURE — 86900 BLOOD TYPING SEROLOGIC ABO: CPT

## 2023-10-15 PROCEDURE — 82550 ASSAY OF CK (CPK): CPT

## 2023-10-15 PROCEDURE — 80143 DRUG ASSAY ACETAMINOPHEN: CPT

## 2023-10-15 PROCEDURE — 85730 THROMBOPLASTIN TIME PARTIAL: CPT

## 2023-10-15 PROCEDURE — 2709999900 HC NON-CHARGEABLE SUPPLY: Performed by: NEUROLOGICAL SURGERY

## 2023-10-15 PROCEDURE — 81001 URINALYSIS AUTO W/SCOPE: CPT

## 2023-10-15 PROCEDURE — 86901 BLOOD TYPING SEROLOGIC RH(D): CPT

## 2023-10-15 PROCEDURE — 2580000003 HC RX 258: Performed by: INTERNAL MEDICINE

## 2023-10-15 PROCEDURE — 80053 COMPREHEN METABOLIC PANEL: CPT

## 2023-10-15 PROCEDURE — 2580000003 HC RX 258: Performed by: NEUROLOGICAL SURGERY

## 2023-10-15 PROCEDURE — 99223 1ST HOSP IP/OBS HIGH 75: CPT | Performed by: NEUROLOGICAL SURGERY

## 2023-10-15 PROCEDURE — 2580000003 HC RX 258: Performed by: ANESTHESIOLOGY

## 2023-10-15 PROCEDURE — 36430 TRANSFUSION BLD/BLD COMPNT: CPT

## 2023-10-15 PROCEDURE — C1713 ANCHOR/SCREW BN/BN,TIS/BN: HCPCS | Performed by: NEUROLOGICAL SURGERY

## 2023-10-15 PROCEDURE — 3600000005 HC SURGERY LEVEL 5 BASE: Performed by: NEUROLOGICAL SURGERY

## 2023-10-15 PROCEDURE — 03HY32Z INSERTION OF MONITORING DEVICE INTO UPPER ARTERY, PERCUTANEOUS APPROACH: ICD-10-PCS | Performed by: INTERNAL MEDICINE

## 2023-10-15 PROCEDURE — P9073 PLATELETS PHERESIS PATH REDU: HCPCS

## 2023-10-15 PROCEDURE — 85025 COMPLETE CBC W/AUTO DIFF WBC: CPT

## 2023-10-15 PROCEDURE — 3700000001 HC ADD 15 MINUTES (ANESTHESIA): Performed by: NEUROLOGICAL SURGERY

## 2023-10-15 PROCEDURE — 86850 RBC ANTIBODY SCREEN: CPT

## 2023-10-15 PROCEDURE — 6360000004 HC RX CONTRAST MEDICATION: Performed by: PHYSICIAN ASSISTANT

## 2023-10-15 PROCEDURE — C1819 TISSUE LOCALIZATION-EXCISION: HCPCS | Performed by: NEUROLOGICAL SURGERY

## 2023-10-15 PROCEDURE — 00C30ZZ EXTIRPATION OF MATTER FROM INTRACRANIAL EPIDURAL SPACE, OPEN APPROACH: ICD-10-PCS | Performed by: NEUROLOGICAL SURGERY

## 2023-10-15 PROCEDURE — 6360000002 HC RX W HCPCS: Performed by: PHYSICIAN ASSISTANT

## 2023-10-15 PROCEDURE — 6360000002 HC RX W HCPCS: Performed by: ANESTHESIOLOGY

## 2023-10-15 PROCEDURE — 70450 CT HEAD/BRAIN W/O DYE: CPT

## 2023-10-15 PROCEDURE — 4A133J1 MONITORING OF ARTERIAL PULSE, PERIPHERAL, PERCUTANEOUS APPROACH: ICD-10-PCS | Performed by: INTERNAL MEDICINE

## 2023-10-15 PROCEDURE — 2720000010 HC SURG SUPPLY STERILE: Performed by: NEUROLOGICAL SURGERY

## 2023-10-15 PROCEDURE — 3600000015 HC SURGERY LEVEL 5 ADDTL 15MIN: Performed by: NEUROLOGICAL SURGERY

## 2023-10-15 PROCEDURE — 6370000000 HC RX 637 (ALT 250 FOR IP): Performed by: NEUROLOGICAL SURGERY

## 2023-10-15 PROCEDURE — 7100000001 HC PACU RECOVERY - ADDTL 15 MIN: Performed by: NEUROLOGICAL SURGERY

## 2023-10-15 PROCEDURE — 6360000002 HC RX W HCPCS: Performed by: NEUROLOGICAL SURGERY

## 2023-10-15 PROCEDURE — 82077 ASSAY SPEC XCP UR&BREATH IA: CPT

## 2023-10-15 PROCEDURE — 82140 ASSAY OF AMMONIA: CPT

## 2023-10-15 PROCEDURE — 82962 GLUCOSE BLOOD TEST: CPT

## 2023-10-15 PROCEDURE — 2000000000 HC ICU R&B

## 2023-10-15 PROCEDURE — 80307 DRUG TEST PRSMV CHEM ANLYZR: CPT

## 2023-10-15 PROCEDURE — 99285 EMERGENCY DEPT VISIT HI MDM: CPT

## 2023-10-15 PROCEDURE — 4A133B1 MONITORING OF ARTERIAL PRESSURE, PERIPHERAL, PERCUTANEOUS APPROACH: ICD-10-PCS | Performed by: INTERNAL MEDICINE

## 2023-10-15 PROCEDURE — 61312 CRNEC/CRNOT STTL XDRL/SDRL: CPT | Performed by: NEUROLOGICAL SURGERY

## 2023-10-15 PROCEDURE — 7100000000 HC PACU RECOVERY - FIRST 15 MIN: Performed by: NEUROLOGICAL SURGERY

## 2023-10-15 PROCEDURE — 70496 CT ANGIOGRAPHY HEAD: CPT

## 2023-10-15 PROCEDURE — 2500000003 HC RX 250 WO HCPCS: Performed by: ANESTHESIOLOGY

## 2023-10-15 PROCEDURE — 85610 PROTHROMBIN TIME: CPT

## 2023-10-15 PROCEDURE — 3700000000 HC ANESTHESIA ATTENDED CARE: Performed by: NEUROLOGICAL SURGERY

## 2023-10-15 DEVICE — COVER BUR H DIA24MM 6 H TI BENT W/O TAB NONCOMPRESSION LO: Type: IMPLANTABLE DEVICE | Site: SKULL | Status: FUNCTIONAL

## 2023-10-15 DEVICE — PLATE BNE L18MM THK03MM 6 H CRANIOMAXILLOFACIAL TI DBL Y FOR: Type: IMPLANTABLE DEVICE | Site: SKULL | Status: FUNCTIONAL

## 2023-10-15 DEVICE — SCREW BNE L4MM DIA1.5MM CRAN SELF DRL CRSS DRV THINFLAP: Type: IMPLANTABLE DEVICE | Site: SKULL | Status: FUNCTIONAL

## 2023-10-15 DEVICE — PLATE BUR H L DIA18.5MM 5 H TI BENT W/O TAB NONCOMPRESSION: Type: IMPLANTABLE DEVICE | Site: SKULL | Status: FUNCTIONAL

## 2023-10-15 RX ORDER — LORAZEPAM 2 MG/1
4 TABLET ORAL
Status: DISCONTINUED | OUTPATIENT
Start: 2023-10-15 | End: 2023-10-21 | Stop reason: HOSPADM

## 2023-10-15 RX ORDER — SODIUM CHLORIDE, SODIUM LACTATE, POTASSIUM CHLORIDE, CALCIUM CHLORIDE 600; 310; 30; 20 MG/100ML; MG/100ML; MG/100ML; MG/100ML
INJECTION, SOLUTION INTRAVENOUS CONTINUOUS PRN
Status: DISCONTINUED | OUTPATIENT
Start: 2023-10-15 | End: 2023-10-15 | Stop reason: SDUPTHER

## 2023-10-15 RX ORDER — SODIUM CHLORIDE 9 MG/ML
INJECTION, SOLUTION INTRAVENOUS CONTINUOUS
Status: DISCONTINUED | OUTPATIENT
Start: 2023-10-15 | End: 2023-10-17

## 2023-10-15 RX ORDER — ACETAMINOPHEN 650 MG/1
650 SUPPOSITORY RECTAL EVERY 6 HOURS PRN
Status: DISCONTINUED | OUTPATIENT
Start: 2023-10-15 | End: 2023-10-21 | Stop reason: HOSPADM

## 2023-10-15 RX ORDER — SODIUM CHLORIDE 0.9 % (FLUSH) 0.9 %
5-40 SYRINGE (ML) INJECTION PRN
Status: DISCONTINUED | OUTPATIENT
Start: 2023-10-15 | End: 2023-10-15 | Stop reason: HOSPADM

## 2023-10-15 RX ORDER — ONDANSETRON 2 MG/ML
4 INJECTION INTRAMUSCULAR; INTRAVENOUS EVERY 6 HOURS PRN
Status: DISCONTINUED | OUTPATIENT
Start: 2023-10-15 | End: 2023-10-21 | Stop reason: HOSPADM

## 2023-10-15 RX ORDER — HYDROMORPHONE HYDROCHLORIDE 1 MG/ML
0.5 INJECTION, SOLUTION INTRAMUSCULAR; INTRAVENOUS; SUBCUTANEOUS EVERY 5 MIN PRN
Status: DISCONTINUED | OUTPATIENT
Start: 2023-10-15 | End: 2023-10-15 | Stop reason: HOSPADM

## 2023-10-15 RX ORDER — LORAZEPAM 2 MG/ML
1 INJECTION INTRAMUSCULAR
Status: DISCONTINUED | OUTPATIENT
Start: 2023-10-15 | End: 2023-10-21 | Stop reason: HOSPADM

## 2023-10-15 RX ORDER — ONDANSETRON 2 MG/ML
INJECTION INTRAMUSCULAR; INTRAVENOUS PRN
Status: DISCONTINUED | OUTPATIENT
Start: 2023-10-15 | End: 2023-10-15 | Stop reason: SDUPTHER

## 2023-10-15 RX ORDER — SODIUM CHLORIDE 0.9 % (FLUSH) 0.9 %
5-40 SYRINGE (ML) INJECTION EVERY 12 HOURS SCHEDULED
Status: DISCONTINUED | OUTPATIENT
Start: 2023-10-15 | End: 2023-10-16 | Stop reason: SDUPTHER

## 2023-10-15 RX ORDER — FOLIC ACID 1 MG/1
1 TABLET ORAL DAILY
Status: DISCONTINUED | OUTPATIENT
Start: 2023-10-16 | End: 2023-10-21 | Stop reason: HOSPADM

## 2023-10-15 RX ORDER — LORAZEPAM 2 MG/ML
2 INJECTION INTRAMUSCULAR
Status: DISCONTINUED | OUTPATIENT
Start: 2023-10-15 | End: 2023-10-21 | Stop reason: HOSPADM

## 2023-10-15 RX ORDER — ONDANSETRON 4 MG/1
4 TABLET, ORALLY DISINTEGRATING ORAL EVERY 8 HOURS PRN
Status: DISCONTINUED | OUTPATIENT
Start: 2023-10-15 | End: 2023-10-21 | Stop reason: HOSPADM

## 2023-10-15 RX ORDER — ONDANSETRON 2 MG/ML
4 INJECTION INTRAMUSCULAR; INTRAVENOUS EVERY 6 HOURS PRN
Status: DISCONTINUED | OUTPATIENT
Start: 2023-10-15 | End: 2023-10-16 | Stop reason: SDUPTHER

## 2023-10-15 RX ORDER — LORAZEPAM 2 MG/ML
4 INJECTION INTRAMUSCULAR
Status: DISCONTINUED | OUTPATIENT
Start: 2023-10-15 | End: 2023-10-21 | Stop reason: HOSPADM

## 2023-10-15 RX ORDER — HYDROCODONE BITARTRATE AND ACETAMINOPHEN 5; 325 MG/1; MG/1
2 TABLET ORAL EVERY 4 HOURS PRN
Status: DISCONTINUED | OUTPATIENT
Start: 2023-10-15 | End: 2023-10-16

## 2023-10-15 RX ORDER — CEFAZOLIN SODIUM 1 G/3ML
INJECTION, POWDER, FOR SOLUTION INTRAMUSCULAR; INTRAVENOUS PRN
Status: DISCONTINUED | OUTPATIENT
Start: 2023-10-15 | End: 2023-10-15 | Stop reason: SDUPTHER

## 2023-10-15 RX ORDER — SODIUM CHLORIDE 9 MG/ML
INJECTION, SOLUTION INTRAVENOUS PRN
Status: DISCONTINUED | OUTPATIENT
Start: 2023-10-15 | End: 2023-10-16 | Stop reason: SDUPTHER

## 2023-10-15 RX ORDER — LIDOCAINE HYDROCHLORIDE 10 MG/ML
INJECTION, SOLUTION EPIDURAL; INFILTRATION; INTRACAUDAL; PERINEURAL PRN
Status: DISCONTINUED | OUTPATIENT
Start: 2023-10-15 | End: 2023-10-15 | Stop reason: SDUPTHER

## 2023-10-15 RX ORDER — HYDROMORPHONE HYDROCHLORIDE 1 MG/ML
0.25 INJECTION, SOLUTION INTRAMUSCULAR; INTRAVENOUS; SUBCUTANEOUS EVERY 5 MIN PRN
Status: DISCONTINUED | OUTPATIENT
Start: 2023-10-15 | End: 2023-10-15 | Stop reason: HOSPADM

## 2023-10-15 RX ORDER — SODIUM CHLORIDE 9 MG/ML
INJECTION, SOLUTION INTRAVENOUS PRN
Status: DISCONTINUED | OUTPATIENT
Start: 2023-10-15 | End: 2023-10-21 | Stop reason: HOSPADM

## 2023-10-15 RX ORDER — DIPHENHYDRAMINE HYDROCHLORIDE 50 MG/ML
12.5 INJECTION INTRAMUSCULAR; INTRAVENOUS
Status: DISCONTINUED | OUTPATIENT
Start: 2023-10-15 | End: 2023-10-15 | Stop reason: HOSPADM

## 2023-10-15 RX ORDER — SODIUM CHLORIDE 0.9 % (FLUSH) 0.9 %
5-40 SYRINGE (ML) INJECTION PRN
Status: DISCONTINUED | OUTPATIENT
Start: 2023-10-15 | End: 2023-10-16 | Stop reason: SDUPTHER

## 2023-10-15 RX ORDER — LEVETIRACETAM 500 MG/5ML
1000 INJECTION, SOLUTION, CONCENTRATE INTRAVENOUS EVERY 12 HOURS
Status: DISCONTINUED | OUTPATIENT
Start: 2023-10-16 | End: 2023-10-16

## 2023-10-15 RX ORDER — ROCURONIUM BROMIDE 10 MG/ML
INJECTION, SOLUTION INTRAVENOUS PRN
Status: DISCONTINUED | OUTPATIENT
Start: 2023-10-15 | End: 2023-10-15 | Stop reason: SDUPTHER

## 2023-10-15 RX ORDER — LANOLIN ALCOHOL/MO/W.PET/CERES
100 CREAM (GRAM) TOPICAL DAILY
Status: DISCONTINUED | OUTPATIENT
Start: 2023-10-16 | End: 2023-10-15 | Stop reason: SDUPTHER

## 2023-10-15 RX ORDER — SODIUM CHLORIDE 0.9 % (FLUSH) 0.9 %
5-40 SYRINGE (ML) INJECTION EVERY 12 HOURS SCHEDULED
Status: DISCONTINUED | OUTPATIENT
Start: 2023-10-15 | End: 2023-10-15 | Stop reason: HOSPADM

## 2023-10-15 RX ORDER — SODIUM CHLORIDE 0.9 % (FLUSH) 0.9 %
10 SYRINGE (ML) INJECTION PRN
Status: DISCONTINUED | OUTPATIENT
Start: 2023-10-15 | End: 2023-10-21 | Stop reason: HOSPADM

## 2023-10-15 RX ORDER — SODIUM CHLORIDE 0.9 % (FLUSH) 0.9 %
10 SYRINGE (ML) INJECTION EVERY 12 HOURS SCHEDULED
Status: DISCONTINUED | OUTPATIENT
Start: 2023-10-15 | End: 2023-10-18

## 2023-10-15 RX ORDER — LORAZEPAM 2 MG/1
2 TABLET ORAL
Status: DISCONTINUED | OUTPATIENT
Start: 2023-10-15 | End: 2023-10-21 | Stop reason: HOSPADM

## 2023-10-15 RX ORDER — ACETAMINOPHEN 325 MG/1
650 TABLET ORAL EVERY 6 HOURS PRN
Status: DISCONTINUED | OUTPATIENT
Start: 2023-10-15 | End: 2023-10-21 | Stop reason: HOSPADM

## 2023-10-15 RX ORDER — SODIUM CHLORIDE 9 MG/ML
INJECTION, SOLUTION INTRAVENOUS PRN
Status: DISCONTINUED | OUTPATIENT
Start: 2023-10-15 | End: 2023-10-15 | Stop reason: HOSPADM

## 2023-10-15 RX ORDER — ACETAMINOPHEN 325 MG/1
650 TABLET ORAL ONCE
Status: DISCONTINUED | OUTPATIENT
Start: 2023-10-15 | End: 2023-10-15

## 2023-10-15 RX ORDER — EPHEDRINE SULFATE 50 MG/ML
INJECTION, SOLUTION INTRAVENOUS PRN
Status: DISCONTINUED | OUTPATIENT
Start: 2023-10-15 | End: 2023-10-15 | Stop reason: SDUPTHER

## 2023-10-15 RX ORDER — GAUZE BANDAGE 2" X 2"
100 BANDAGE TOPICAL DAILY
Status: DISCONTINUED | OUTPATIENT
Start: 2023-10-16 | End: 2023-10-21 | Stop reason: HOSPADM

## 2023-10-15 RX ORDER — LORAZEPAM 2 MG/ML
3 INJECTION INTRAMUSCULAR
Status: DISCONTINUED | OUTPATIENT
Start: 2023-10-15 | End: 2023-10-21 | Stop reason: HOSPADM

## 2023-10-15 RX ORDER — FENTANYL CITRATE 50 UG/ML
INJECTION, SOLUTION INTRAMUSCULAR; INTRAVENOUS PRN
Status: DISCONTINUED | OUTPATIENT
Start: 2023-10-15 | End: 2023-10-15 | Stop reason: SDUPTHER

## 2023-10-15 RX ORDER — HYDROCODONE BITARTRATE AND ACETAMINOPHEN 5; 325 MG/1; MG/1
1 TABLET ORAL EVERY 4 HOURS PRN
Status: DISCONTINUED | OUTPATIENT
Start: 2023-10-15 | End: 2023-10-16

## 2023-10-15 RX ORDER — LORAZEPAM 1 MG/1
1 TABLET ORAL
Status: DISCONTINUED | OUTPATIENT
Start: 2023-10-15 | End: 2023-10-21 | Stop reason: HOSPADM

## 2023-10-15 RX ORDER — LEVETIRACETAM 500 MG/5ML
1000 INJECTION, SOLUTION, CONCENTRATE INTRAVENOUS EVERY 12 HOURS
Status: DISCONTINUED | OUTPATIENT
Start: 2023-10-15 | End: 2023-10-15 | Stop reason: SDUPTHER

## 2023-10-15 RX ORDER — PROPOFOL 10 MG/ML
INJECTION, EMULSION INTRAVENOUS PRN
Status: DISCONTINUED | OUTPATIENT
Start: 2023-10-15 | End: 2023-10-15 | Stop reason: SDUPTHER

## 2023-10-15 RX ORDER — DEXAMETHASONE SODIUM PHOSPHATE 10 MG/ML
INJECTION, SOLUTION INTRAMUSCULAR; INTRAVENOUS PRN
Status: DISCONTINUED | OUTPATIENT
Start: 2023-10-15 | End: 2023-10-15 | Stop reason: SDUPTHER

## 2023-10-15 RX ORDER — METOCLOPRAMIDE HYDROCHLORIDE 5 MG/ML
10 INJECTION INTRAMUSCULAR; INTRAVENOUS
Status: DISCONTINUED | OUTPATIENT
Start: 2023-10-15 | End: 2023-10-15 | Stop reason: HOSPADM

## 2023-10-15 RX ORDER — SODIUM CHLORIDE 9 MG/ML
INJECTION, SOLUTION INTRAVENOUS PRN
Status: DISCONTINUED | OUTPATIENT
Start: 2023-10-15 | End: 2023-10-16 | Stop reason: ALTCHOICE

## 2023-10-15 RX ORDER — MANNITOL 20 G/100ML
1 INJECTION, SOLUTION INTRAVENOUS ONCE
Status: COMPLETED | OUTPATIENT
Start: 2023-10-15 | End: 2023-10-15

## 2023-10-15 RX ADMIN — SODIUM CHLORIDE, PRESERVATIVE FREE 10 ML: 5 INJECTION INTRAVENOUS at 23:16

## 2023-10-15 RX ADMIN — SODIUM CHLORIDE: 9 INJECTION, SOLUTION INTRAVENOUS at 23:16

## 2023-10-15 RX ADMIN — MANNITOL 100 G: 20 INJECTION, SOLUTION INTRAVENOUS at 18:25

## 2023-10-15 RX ADMIN — EPHEDRINE SULFATE 10 MG: 50 INJECTION INTRAMUSCULAR; INTRAVENOUS; SUBCUTANEOUS at 19:12

## 2023-10-15 RX ADMIN — FENTANYL CITRATE 100 MCG: 0.05 INJECTION, SOLUTION INTRAMUSCULAR; INTRAVENOUS at 18:22

## 2023-10-15 RX ADMIN — PHENYLEPHRINE HYDROCHLORIDE 100 MCG: 10 INJECTION INTRAVENOUS at 20:05

## 2023-10-15 RX ADMIN — ROCURONIUM BROMIDE 50 MG: 10 INJECTION, SOLUTION INTRAVENOUS at 18:02

## 2023-10-15 RX ADMIN — ROCURONIUM BROMIDE 50 MG: 10 INJECTION, SOLUTION INTRAVENOUS at 19:08

## 2023-10-15 RX ADMIN — HYDROMORPHONE HYDROCHLORIDE 0.5 MG: 1 INJECTION, SOLUTION INTRAMUSCULAR; INTRAVENOUS; SUBCUTANEOUS at 21:55

## 2023-10-15 RX ADMIN — ONDANSETRON 4 MG: 2 INJECTION INTRAMUSCULAR; INTRAVENOUS at 19:38

## 2023-10-15 RX ADMIN — DEXAMETHASONE SODIUM PHOSPHATE 4 MG: 10 INJECTION, SOLUTION INTRAMUSCULAR; INTRAVENOUS at 19:38

## 2023-10-15 RX ADMIN — PHENYLEPHRINE HYDROCHLORIDE 100 MCG: 10 INJECTION INTRAVENOUS at 19:13

## 2023-10-15 RX ADMIN — SODIUM CHLORIDE, SODIUM LACTATE, POTASSIUM CHLORIDE, AND CALCIUM CHLORIDE: 600; 310; 30; 20 INJECTION, SOLUTION INTRAVENOUS at 19:36

## 2023-10-15 RX ADMIN — SODIUM CHLORIDE, SODIUM LACTATE, POTASSIUM CHLORIDE, AND CALCIUM CHLORIDE: 600; 310; 30; 20 INJECTION, SOLUTION INTRAVENOUS at 17:57

## 2023-10-15 RX ADMIN — PROPOFOL 200 MG: 10 INJECTION, EMULSION INTRAVENOUS at 18:02

## 2023-10-15 RX ADMIN — HYDROMORPHONE HYDROCHLORIDE 0.5 MG: 1 INJECTION, SOLUTION INTRAMUSCULAR; INTRAVENOUS; SUBCUTANEOUS at 21:42

## 2023-10-15 RX ADMIN — SODIUM CHLORIDE, PRESERVATIVE FREE 10 ML: 5 INJECTION INTRAVENOUS at 23:15

## 2023-10-15 RX ADMIN — PROPOFOL 100 MG: 10 INJECTION, EMULSION INTRAVENOUS at 18:22

## 2023-10-15 RX ADMIN — SODIUM CHLORIDE 2.5 MG/HR: 9 INJECTION, SOLUTION INTRAVENOUS at 21:52

## 2023-10-15 RX ADMIN — IOPAMIDOL 90 ML: 755 INJECTION, SOLUTION INTRAVENOUS at 16:19

## 2023-10-15 RX ADMIN — PHENYLEPHRINE HYDROCHLORIDE 100 MCG: 10 INJECTION INTRAVENOUS at 19:25

## 2023-10-15 RX ADMIN — FENTANYL CITRATE 100 MCG: 0.05 INJECTION, SOLUTION INTRAMUSCULAR; INTRAVENOUS at 18:02

## 2023-10-15 RX ADMIN — PROPOFOL 100 MG: 10 INJECTION, EMULSION INTRAVENOUS at 19:08

## 2023-10-15 RX ADMIN — FENTANYL CITRATE 50 MCG: 0.05 INJECTION, SOLUTION INTRAMUSCULAR; INTRAVENOUS at 19:47

## 2023-10-15 RX ADMIN — LEVETIRACETAM 500 MG: 500 INJECTION, SOLUTION, CONCENTRATE INTRAVENOUS at 18:20

## 2023-10-15 RX ADMIN — LEVETIRACETAM 500 MG: 500 INJECTION, SOLUTION, CONCENTRATE INTRAVENOUS at 18:34

## 2023-10-15 RX ADMIN — THIAMINE HYDROCHLORIDE: 100 INJECTION, SOLUTION INTRAMUSCULAR; INTRAVENOUS at 23:14

## 2023-10-15 RX ADMIN — ROCURONIUM BROMIDE 50 MG: 10 INJECTION, SOLUTION INTRAVENOUS at 18:22

## 2023-10-15 RX ADMIN — LIDOCAINE HYDROCHLORIDE 50 MG: 10 INJECTION, SOLUTION EPIDURAL; INFILTRATION; INTRACAUDAL; PERINEURAL at 18:02

## 2023-10-15 RX ADMIN — CEFAZOLIN SODIUM 2 G: 1 INJECTION, POWDER, FOR SOLUTION INTRAMUSCULAR; INTRAVENOUS at 18:29

## 2023-10-15 ASSESSMENT — LIFESTYLE VARIABLES: SMOKING_STATUS: 1

## 2023-10-15 ASSESSMENT — PAIN DESCRIPTION - LOCATION
LOCATION: HEAD
LOCATION: HEAD

## 2023-10-15 ASSESSMENT — PAIN DESCRIPTION - DESCRIPTORS
DESCRIPTORS: ACHING
DESCRIPTORS: ACHING

## 2023-10-15 ASSESSMENT — ENCOUNTER SYMPTOMS
SHORTNESS OF BREATH: 0
APNEA: 0
BACK PAIN: 0
COUGH: 0
EYE ITCHING: 0
PHOTOPHOBIA: 0
EYE DISCHARGE: 0
COLOR CHANGE: 0

## 2023-10-15 ASSESSMENT — PAIN DESCRIPTION - ORIENTATION: ORIENTATION: LEFT

## 2023-10-15 ASSESSMENT — PAIN - FUNCTIONAL ASSESSMENT: PAIN_FUNCTIONAL_ASSESSMENT: NONE - DENIES PAIN

## 2023-10-15 ASSESSMENT — PAIN SCALES - GENERAL: PAINLEVEL_OUTOF10: 5
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
## 2023-10-15 NOTE — H&P
following:  Automated exposure control, adjustment of the mA and/or kV according to size, and the use of iterative reconstruction technique. FINDINGS:  There is a large lenticular shaped extra-axial high attenuation fluid collection adjacent to the left frontal lobe, right parietal lobe, and right temporal lobe measuring up to 11.6 x 3.3 x 9.8 cm. There is significant mass effect on the subjacent brain parenchyma causing left to right midline shift of 1.4 cm. There is high attenuation hemorrhage along the right side of the interhemispheric fissure. There is additional high attenuation subdural hemorrhage along the right frontal and temporal lobe. There are cortical areas of high attenuation foci in the right frontal lobe that likely reflect contusions. There is extra-axial gas bilaterally. There is suggestion of a nondisplaced fracture of the squamous portion of the left sphenoid bone. No hydrocephalous. The gray matter interface is maintained. Minimal bilateral mastoid effusions are suggested. The paranasal sinuses are grossly clear. 1. Large left frontal/parietal/temporal epidural hematoma with mass effect causing left right midline shift up to 1.4 cm. 2. Nondisplaced fracture of the squamous portion of the left sphenoid bone suggested. 3. Acute subdural hemorrhage adjacent to the right frontal temporal lobes. 4. Acute small right interhemispheric fissure subdural hemorrhage. 5. Probable cortical contusions in the right frontal lobe. 6. Minimal bilateral extra-axial pneumocephalus. 7. Critical results discussed with Dr. Isabelle Garcia in the ER on 10/15/2023 at 1643 hours. .  All CT scans are performed using dose optimization techniques as appropriate to the performed exam and include at least one of the following: Automated exposure control, adjustment of the mA and/or kV according to size, and the use of iterative reconstruction technique.   ______________________________________ Electronically signed by: [id number] Mell LEAHY Date:     10/15/2023 Time:    16:37     CT CERVICAL SPINE WO CONTRAST    Result Date: 10/15/2023  EXAM:  CT CERVICAL SPINE. HISTORY:  What is fall. COMPARISON:  None. TECHNIQUE:  Contiguous axial images at 2 mm intervals were obtained from the base of the skull to the thoracic spine. Sagittal and coronal reformats were reviewed. No contrast was given. FINDINGS:  There is normal curvature and alignment. The vertebral heights are well maintained. There are no acute or healing fractures. There are no lytic or blastic lesions. No disc bulges are identified. The soft tissues are normal. The airway is  widely patent. Limited views of the lung apices are normal.  C 2-3: Disc narrowing. Facet hypertrophy on the right. No spinal canal narrowing. Small right neural foramen narrowing. C 3-4: Disc narrowing. No spinal canal or neural foraminal narrowing. C 4-5: Disc narrowing. Facet hypertrophy. Minimal right neural foraminal narrowing. C 5-6: Disc narrowing. Posterior osteophytes. Bilateral neural foraminal narrowing. C 6-7: Disc narrowing. No spinal canal or neural foramen narrowing. 1.  No acute fractures. 2.  Degenerative disc disease. All CT scans are performed using dose optimization techniques as appropriate to the performed exam and include at least one of the following: Automated exposure control, adjustment of the mA and/or kV according to size, and the use of iterative reconstruction technique. ______________________________________ Electronically signed by: Radha Keith M.D. Date:     10/15/2023 Time:    16:41           Assessment and Plan     Subdural hematoma and epidural hematoma. To the OR this evening emergently for decompression and drainage. Probable alcohol withdrawal.  Admit to ICU postop. Monitor for withdrawal symptoms. Further recommendations to follow.     Please document 40 minutes of critical care time for patient assessment, chart
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
## 2023-10-15 NOTE — ED NOTES
My Hernandez called [facility] Stroke, CT aware 6906. Announced over [address] Alerted Dr Trae Chavez [phone]. [address] [facility] Stroke Coordinator 0438.   LKW undetermined time yesterday     Dell Carr  10/15/23 0410

## 2023-10-16 ENCOUNTER — APPOINTMENT (OUTPATIENT)
Dept: CT IMAGING | Age: 48
End: 2023-10-16
Payer: MEDICAID

## 2023-10-16 LAB
ALBUMIN SERPL-MCNC: 4.1 G/DL (ref 3.5–5.2)
ALP SERPL-CCNC: 66 U/L (ref 40–130)
ALT SERPL-CCNC: 15 U/L (ref 5–41)
ANION GAP SERPL CALCULATED.3IONS-SCNC: 13 MMOL/L (ref 7–19)
AST SERPL-CCNC: 28 U/L (ref 5–40)
BASOPHILS # BLD: 0 K/UL (ref 0–0.2)
BASOPHILS NFR BLD: 0.1 % (ref 0–1)
BILIRUB SERPL-MCNC: 0.9 MG/DL (ref 0.2–1.2)
BUN SERPL-MCNC: 12 MG/DL (ref 6–20)
CALCIUM SERPL-MCNC: 8.3 MG/DL (ref 8.6–10)
CHLORIDE SERPL-SCNC: 102 MMOL/L (ref 98–111)
CO2 SERPL-SCNC: 27 MMOL/L (ref 22–29)
CREAT SERPL-MCNC: 0.5 MG/DL (ref 0.5–1.2)
EOSINOPHIL # BLD: 0 K/UL (ref 0–0.6)
EOSINOPHIL NFR BLD: 0 % (ref 0–5)
ERYTHROCYTE [DISTWIDTH] IN BLOOD BY AUTOMATED COUNT: 13 % (ref 11.5–14.5)
GLUCOSE SERPL-MCNC: 204 MG/DL (ref 74–109)
HCT VFR BLD AUTO: 30.7 % (ref 42–52)
HGB BLD-MCNC: 10.6 G/DL (ref 14–18)
IMM GRANULOCYTES # BLD: 0.1 K/UL
LYMPHOCYTES # BLD: 0.3 K/UL (ref 1.1–4.5)
LYMPHOCYTES NFR BLD: 2 % (ref 20–40)
MCH RBC QN AUTO: 34 PG (ref 27–31)
MCHC RBC AUTO-ENTMCNC: 34.5 G/DL (ref 33–37)
MCV RBC AUTO: 98.4 FL (ref 80–94)
MONOCYTES # BLD: 0.5 K/UL (ref 0–0.9)
MONOCYTES NFR BLD: 3.2 % (ref 0–10)
NEUTROPHILS # BLD: 13.3 K/UL (ref 1.5–7.5)
NEUTS SEG NFR BLD: 94.1 % (ref 50–65)
PLATELET # BLD AUTO: 142 K/UL (ref 130–400)
PMV BLD AUTO: 10.2 FL (ref 9.4–12.4)
POTASSIUM SERPL-SCNC: 4.6 MMOL/L (ref 3.5–5)
PROT SERPL-MCNC: 6 G/DL (ref 6.6–8.7)
RBC # BLD AUTO: 3.12 M/UL (ref 4.7–6.1)
SODIUM SERPL-SCNC: 142 MMOL/L (ref 136–145)
WBC # BLD AUTO: 14.2 K/UL (ref 4.8–10.8)

## 2023-10-16 PROCEDURE — 6360000002 HC RX W HCPCS: Performed by: PHYSICIAN ASSISTANT

## 2023-10-16 PROCEDURE — 85025 COMPLETE CBC W/AUTO DIFF WBC: CPT

## 2023-10-16 PROCEDURE — 6370000000 HC RX 637 (ALT 250 FOR IP): Performed by: INTERNAL MEDICINE

## 2023-10-16 PROCEDURE — 2580000003 HC RX 258: Performed by: NEUROLOGICAL SURGERY

## 2023-10-16 PROCEDURE — 99024 POSTOP FOLLOW-UP VISIT: CPT | Performed by: NEUROLOGICAL SURGERY

## 2023-10-16 PROCEDURE — 2000000000 HC ICU R&B

## 2023-10-16 PROCEDURE — 80053 COMPREHEN METABOLIC PANEL: CPT

## 2023-10-16 PROCEDURE — 6370000000 HC RX 637 (ALT 250 FOR IP): Performed by: NEUROLOGICAL SURGERY

## 2023-10-16 PROCEDURE — 70450 CT HEAD/BRAIN W/O DYE: CPT

## 2023-10-16 PROCEDURE — 2500000003 HC RX 250 WO HCPCS: Performed by: NEUROLOGICAL SURGERY

## 2023-10-16 PROCEDURE — 6360000002 HC RX W HCPCS: Performed by: NEUROLOGICAL SURGERY

## 2023-10-16 PROCEDURE — 2580000003 HC RX 258: Performed by: INTERNAL MEDICINE

## 2023-10-16 RX ORDER — HYDROCODONE BITARTRATE AND ACETAMINOPHEN 10; 325 MG/1; MG/1
1 TABLET ORAL EVERY 4 HOURS PRN
Status: DISCONTINUED | OUTPATIENT
Start: 2023-10-16 | End: 2023-10-21 | Stop reason: HOSPADM

## 2023-10-16 RX ORDER — DEXMEDETOMIDINE HYDROCHLORIDE 4 UG/ML
.1-1.5 INJECTION, SOLUTION INTRAVENOUS CONTINUOUS
Status: DISCONTINUED | OUTPATIENT
Start: 2023-10-16 | End: 2023-10-17

## 2023-10-16 RX ORDER — DEXMEDETOMIDINE HYDROCHLORIDE 4 UG/ML
INJECTION, SOLUTION INTRAVENOUS
Status: DISPENSED
Start: 2023-10-16 | End: 2023-10-16

## 2023-10-16 RX ORDER — LEVETIRACETAM 500 MG/5ML
500 INJECTION, SOLUTION, CONCENTRATE INTRAVENOUS EVERY 12 HOURS
Status: DISCONTINUED | OUTPATIENT
Start: 2023-10-16 | End: 2023-10-17

## 2023-10-16 RX ORDER — TADALAFIL 20 MG/1
20 TABLET ORAL PRN
COMMUNITY

## 2023-10-16 RX ORDER — NICOTINE 21 MG/24HR
1 PATCH, TRANSDERMAL 24 HOURS TRANSDERMAL DAILY
Status: DISCONTINUED | OUTPATIENT
Start: 2023-10-16 | End: 2023-10-21 | Stop reason: HOSPADM

## 2023-10-16 RX ORDER — MORPHINE SULFATE 2 MG/ML
2 INJECTION, SOLUTION INTRAMUSCULAR; INTRAVENOUS
Status: DISCONTINUED | OUTPATIENT
Start: 2023-10-16 | End: 2023-10-21 | Stop reason: HOSPADM

## 2023-10-16 RX ORDER — FINASTERIDE 1 MG/1
1 TABLET, FILM COATED ORAL DAILY
COMMUNITY

## 2023-10-16 RX ORDER — ESCITALOPRAM OXALATE 10 MG/1
10 TABLET ORAL DAILY
COMMUNITY

## 2023-10-16 RX ORDER — PROPRANOLOL HYDROCHLORIDE 10 MG/1
10 TABLET ORAL 2 TIMES DAILY
COMMUNITY

## 2023-10-16 RX ORDER — PAROXETINE HYDROCHLORIDE 20 MG/1
20 TABLET, FILM COATED ORAL EVERY MORNING
COMMUNITY

## 2023-10-16 RX ORDER — BUSPIRONE HYDROCHLORIDE 15 MG/1
15 TABLET ORAL 3 TIMES DAILY
COMMUNITY

## 2023-10-16 RX ORDER — SILDENAFIL 100 MG/1
100 TABLET, FILM COATED ORAL PRN
COMMUNITY

## 2023-10-16 RX ADMIN — FOLIC ACID 1 MG: 1 TABLET ORAL at 09:05

## 2023-10-16 RX ADMIN — Medication 100 MG: at 09:05

## 2023-10-16 RX ADMIN — HYDROCODONE BITARTRATE AND ACETAMINOPHEN 1 TABLET: 5; 325 TABLET ORAL at 14:26

## 2023-10-16 RX ADMIN — WATER 2000 MG: 1 INJECTION INTRAMUSCULAR; INTRAVENOUS; SUBCUTANEOUS at 23:19

## 2023-10-16 RX ADMIN — HYDROCODONE BITARTRATE AND ACETAMINOPHEN 1 TABLET: 5; 325 TABLET ORAL at 09:05

## 2023-10-16 RX ADMIN — SODIUM CHLORIDE, PRESERVATIVE FREE 10 ML: 5 INJECTION INTRAVENOUS at 09:00

## 2023-10-16 RX ADMIN — LORAZEPAM 2 MG: 2 TABLET ORAL at 16:41

## 2023-10-16 RX ADMIN — DEXMEDETOMIDINE HYDROCHLORIDE 0.4 MCG/KG/HR: 400 INJECTION, SOLUTION INTRAVENOUS at 04:47

## 2023-10-16 RX ADMIN — LORAZEPAM 2 MG: 2 TABLET ORAL at 14:26

## 2023-10-16 RX ADMIN — LORAZEPAM 2 MG: 2 TABLET ORAL at 09:05

## 2023-10-16 RX ADMIN — HYDROCODONE BITARTRATE AND ACETAMINOPHEN 1 TABLET: 10; 325 TABLET ORAL at 16:40

## 2023-10-16 RX ADMIN — HYDROCODONE BITARTRATE AND ACETAMINOPHEN 1 TABLET: 5; 325 TABLET ORAL at 09:03

## 2023-10-16 RX ADMIN — LEVETIRACETAM 1000 MG: 100 INJECTION INTRAVENOUS at 04:41

## 2023-10-16 RX ADMIN — DEXMEDETOMIDINE HYDROCHLORIDE 0.2 MCG/KG/HR: 400 INJECTION, SOLUTION INTRAVENOUS at 00:38

## 2023-10-16 RX ADMIN — WATER 2000 MG: 1 INJECTION INTRAMUSCULAR; INTRAVENOUS; SUBCUTANEOUS at 08:00

## 2023-10-16 RX ADMIN — LEVETIRACETAM 500 MG: 100 INJECTION INTRAVENOUS at 16:40

## 2023-10-16 RX ADMIN — SODIUM CHLORIDE: 9 INJECTION, SOLUTION INTRAVENOUS at 12:18

## 2023-10-16 RX ADMIN — WATER 2000 MG: 1 INJECTION INTRAMUSCULAR; INTRAVENOUS; SUBCUTANEOUS at 16:00

## 2023-10-16 ASSESSMENT — PAIN DESCRIPTION - DESCRIPTORS
DESCRIPTORS: SORE
DESCRIPTORS: SORE
DESCRIPTORS: ACHING
DESCRIPTORS: ACHING;SORE

## 2023-10-16 ASSESSMENT — ENCOUNTER SYMPTOMS
COLOR CHANGE: 0
SHORTNESS OF BREATH: 0
NAUSEA: 0
VOMITING: 0
BACK PAIN: 0
CONSTIPATION: 0
DIARRHEA: 0
VOICE CHANGE: 0

## 2023-10-16 ASSESSMENT — PAIN DESCRIPTION - LOCATION
LOCATION: HEAD

## 2023-10-16 ASSESSMENT — PAIN DESCRIPTION - ONSET: ONSET: ON-GOING

## 2023-10-16 ASSESSMENT — PAIN - FUNCTIONAL ASSESSMENT
PAIN_FUNCTIONAL_ASSESSMENT: PREVENTS OR INTERFERES WITH MANY ACTIVE NOT PASSIVE ACTIVITIES

## 2023-10-16 ASSESSMENT — PAIN SCALES - GENERAL
PAINLEVEL_OUTOF10: 6
PAINLEVEL_OUTOF10: 6
PAINLEVEL_OUTOF10: 7

## 2023-10-16 ASSESSMENT — PAIN DESCRIPTION - ORIENTATION
ORIENTATION: LEFT

## 2023-10-16 ASSESSMENT — PAIN DESCRIPTION - PAIN TYPE
TYPE: SURGICAL PAIN
TYPE: SURGICAL PAIN

## 2023-10-16 ASSESSMENT — PAIN DESCRIPTION - FREQUENCY: FREQUENCY: CONTINUOUS

## 2023-10-16 NOTE — PROGRESS NOTES
status post left craniotomy. 2.  Stable right frontal subdural hematoma and probable intraparenchymal hematoma. All CT scans are performed using dose optimization techniques as appropriate to the performed exam and include at least one of the following: Automated exposure control, adjustment of the mA and/or kV according to size, and the use of iterative reconstruction technique. ______________________________________ Electronically signed by: Zora Combs M.D. Date:     10/16/2023 Time:    06:14     CTA HEAD NECK W CONTRAST    Result Date: 10/15/2023  EXAM:  CTA HEAD AND NECK WITH CONTRAST  HISTORY:  Concern for bleed, right arm and leg weakness  COMPARISON:  CT head from 10/15/2023  TECHNIQUE:  Multi-slice postcontrast transaxial helical images are acquired through the head and neck according to an angiogram protocol. 3-D volume images are provided. All CT scans are performed using dose optimization techniques as appropriate to the performed exam and includes at least one of the following:  Automated exposure control, adjustment of the mA and/or kV according to size, and the use of iterative reconstruction technique. CONTRAST: 90 mL Isovue 370 IV  FINDINGS:  The aortic arch has normal caliber and patency. The vertebral arteries are widely patent bilaterally. The subclavian and basilic arteries are patent. The common carotid arteries are widely patent. There is mild calcified atheromatous plaque at the bilateral carotid bulbs without evidence of hemodynamically significant stenosis. There is minimal calcified atheromatous plaque in the proximal right internal carotid artery. The remaining cervical segments of the internal arteries are widely patent. No dissections. The petrous through supraclinoid segments of the internal arteries are patent. The middle cerebral arteries, anterior cerebral arteries, and posterior cerebral arteries are patent bilaterally.   The anterior communicating artery is neural foramen narrowing. C 3-4: Disc narrowing. No spinal canal or neural foraminal narrowing. C 4-5: Disc narrowing. Facet hypertrophy. Minimal right neural foraminal narrowing. C 5-6: Disc narrowing. Posterior osteophytes. Bilateral neural foraminal narrowing. C 6-7: Disc narrowing. No spinal canal or neural foramen narrowing. 1.  No acute fractures. 2.  Degenerative disc disease. All CT scans are performed using dose optimization techniques as appropriate to the performed exam and include at least one of the following: Automated exposure control, adjustment of the mA and/or kV according to size, and the use of iterative reconstruction technique. ______________________________________ Electronically signed by: Rudy Callejas M.D. Date:     10/15/2023 Time:    16:41        Assessment     Subdural hematoma and epidural hematoma. Status post craniotomy and evacuation of epidural hematoma. Chronic alcohol use. No evidence of withdrawal at this point. .     Please document 40 minutes of critical care time for patient assessment, chart review, discussion with staff, .       Todd Joseph DO

## 2023-10-16 NOTE — OP NOTE
NEUROSURGICAL OPERATIVE REPORT     PATIENT NAME: Ajay Trevizo     DATE OF PROCEDURE: 10/15/2023     ATTENDING SURGEON:  Jem Guerra. April Ortiz MD, PhD     PREOPERATIVE DIAGNOSIS:  Acute left epidural hematoma with impending herniation     POSTOPERATIVE DIAGNOSIS:  Acute left epidural hematoma with impending herniation     PROCEDURE PERFORMED:  Left fronttemporoparietal craniotomy for evacuation of epidural hematoma. ESTIMATED BLOOD LOSS: 200 mL        INDICATIONS:  Symptomatic 3cm left epidural hematoma with mass effect and 12 mm of midline shift     PROCEDURE IN DETAIL:  The patient was identified in the Emergency Department and transferred to the operating room by the anesthesia team. General endotracheal anesthesia was induced, and then an arterial line and a Jolly catheter were placed. The patient was then placed in the supine position and the Lyons skull clamp was applied and fixed to the bed. The bed was turned 90 degrees and the head was slightly elevated. Electric clippers were used to clip the left hemicranium and then a reverse question rissa incision was planned. The head was then prepped and draped in the usual aseptic fashion. A team pause was held according to the preformatted script, all were in agreement, and the decision was made to proceed with surgery. The planned incision was infiltrated with local anesthetic and opened sharply with a skin knife and carried down to the bone. Hemostasis was obtained from the scalp edge with cautery and Boby clips and then a myocutaneous flap was reflected anteriorly. With hemostasis from the scalp flap obtained, we then turned our attention to the craniotomy. The  attachment on the high-speed drill was used to place rhonda holes at the root of the zygoma, the anatomic keyhole and then in the frontal and parietal regions on the left side. Remnant bone was removed from the rhonda holes with a curette and then hemostats.   In the temporal and parietal regions, a large epidural hematoma was readily apparent through the rhonda holes as was a linear skull fracture. The footplate craniotome was used to turn a large craniotomy flap. Once we had completed the craniotomy, we then elevated the bone flap and it was handed off to the back table. We immediately noted a large underlying acute epidural hematoma in the expected location. This was removed in its entirety. We noted acute bleeding from branches of the middle meningeal artery under the skull fracture and these were controlled with bipolar cautery. We then inspected around the  margins of the craniotomy and carefully removed any remaining hematoma with suction and irrigation. We then drilled  holes for epidural tack up sutures and these were placed around the craniotomy with 4-O nurolon. Once the dura was tacked up, we then placed hemostatic foam and fibrillar around the margins of the craniotomy. Holes were then drilled in the bone flap for additional central tack up sutures. The bone flap was then re-plated with plates and screws from the cranial fixation tray and was re-affixed to the skull. The central tack up sutures were then tied. A medium hemovac drain was then left in the subgaleal space and tunneled from the wound. We then turned our attention to scalp closure. The temporalis was reapproximated with interrupted 0 Vicryl sutures. The galea  was then closed with inverted interrupted 2-0 Vicryl sutures and the skin was closed with staples. The drain was then secured to the scalp with a vicryl stitch and the suction reservoir was connected. With the wound closed, bacitracin ointment was applied to the incision and then a Telfa pad was applied and her head was wrapped with a gauze head wrap. Once the head wrap was in place, the control of the operation was returned to Anesthesia for extubation and transport to recovery.   All sponge, needle, and instrument counts were

## 2023-10-16 NOTE — PROGRESS NOTES
Pt has slight movement on the Rt side with legs and arm. He struggled trying to get out of bed. Dr. Rodriguez Sat is aware of the weakness on the rt side.

## 2023-10-17 LAB
ALBUMIN SERPL-MCNC: 3.5 G/DL (ref 3.5–5.2)
ALP SERPL-CCNC: 56 U/L (ref 40–130)
ALT SERPL-CCNC: 19 U/L (ref 5–41)
ANION GAP SERPL CALCULATED.3IONS-SCNC: 7 MMOL/L (ref 7–19)
AST SERPL-CCNC: 34 U/L (ref 5–40)
BILIRUB SERPL-MCNC: 0.5 MG/DL (ref 0.2–1.2)
BUN SERPL-MCNC: 12 MG/DL (ref 6–20)
CALCIUM SERPL-MCNC: 8.3 MG/DL (ref 8.6–10)
CHLORIDE SERPL-SCNC: 101 MMOL/L (ref 98–111)
CO2 SERPL-SCNC: 27 MMOL/L (ref 22–29)
CREAT SERPL-MCNC: 0.5 MG/DL (ref 0.5–1.2)
GLUCOSE SERPL-MCNC: 101 MG/DL (ref 74–109)
MAGNESIUM SERPL-MCNC: 1.9 MG/DL (ref 1.6–2.6)
POTASSIUM SERPL-SCNC: 3.4 MMOL/L (ref 3.5–5)
PROT SERPL-MCNC: 5.4 G/DL (ref 6.6–8.7)
SODIUM SERPL-SCNC: 135 MMOL/L (ref 136–145)

## 2023-10-17 PROCEDURE — 92523 SPEECH SOUND LANG COMPREHEN: CPT

## 2023-10-17 PROCEDURE — 6360000002 HC RX W HCPCS: Performed by: INTERNAL MEDICINE

## 2023-10-17 PROCEDURE — 99024 POSTOP FOLLOW-UP VISIT: CPT | Performed by: NEUROLOGICAL SURGERY

## 2023-10-17 PROCEDURE — HZ2ZZZZ DETOXIFICATION SERVICES FOR SUBSTANCE ABUSE TREATMENT: ICD-10-PCS | Performed by: INTERNAL MEDICINE

## 2023-10-17 PROCEDURE — 6370000000 HC RX 637 (ALT 250 FOR IP): Performed by: NEUROLOGICAL SURGERY

## 2023-10-17 PROCEDURE — 2000000000 HC ICU R&B

## 2023-10-17 PROCEDURE — 6370000000 HC RX 637 (ALT 250 FOR IP): Performed by: INTERNAL MEDICINE

## 2023-10-17 PROCEDURE — 80053 COMPREHEN METABOLIC PANEL: CPT

## 2023-10-17 PROCEDURE — 92610 EVALUATE SWALLOWING FUNCTION: CPT

## 2023-10-17 PROCEDURE — 36415 COLL VENOUS BLD VENIPUNCTURE: CPT

## 2023-10-17 PROCEDURE — 2500000003 HC RX 250 WO HCPCS: Performed by: INTERNAL MEDICINE

## 2023-10-17 PROCEDURE — 83735 ASSAY OF MAGNESIUM: CPT

## 2023-10-17 PROCEDURE — 6360000002 HC RX W HCPCS: Performed by: NEUROLOGICAL SURGERY

## 2023-10-17 PROCEDURE — 2580000003 HC RX 258: Performed by: INTERNAL MEDICINE

## 2023-10-17 PROCEDURE — 2580000003 HC RX 258: Performed by: NEUROLOGICAL SURGERY

## 2023-10-17 PROCEDURE — 94760 N-INVAS EAR/PLS OXIMETRY 1: CPT

## 2023-10-17 RX ORDER — LEVETIRACETAM 500 MG/1
500 TABLET ORAL 2 TIMES DAILY
Status: DISCONTINUED | OUTPATIENT
Start: 2023-10-17 | End: 2023-10-21 | Stop reason: HOSPADM

## 2023-10-17 RX ORDER — POLYETHYLENE GLYCOL 3350 17 G
2 POWDER IN PACKET (EA) ORAL
Status: DISCONTINUED | OUTPATIENT
Start: 2023-10-17 | End: 2023-10-21 | Stop reason: HOSPADM

## 2023-10-17 RX ORDER — DEXMEDETOMIDINE HYDROCHLORIDE 4 UG/ML
.1-1.5 INJECTION, SOLUTION INTRAVENOUS CONTINUOUS
Status: DISCONTINUED | OUTPATIENT
Start: 2023-10-17 | End: 2023-10-20

## 2023-10-17 RX ADMIN — LORAZEPAM 2 MG: 2 TABLET ORAL at 07:54

## 2023-10-17 RX ADMIN — FOLIC ACID 1 MG: 1 TABLET ORAL at 07:54

## 2023-10-17 RX ADMIN — LORAZEPAM 2 MG: 2 TABLET ORAL at 03:07

## 2023-10-17 RX ADMIN — NICOTINE POLACRILEX 2 MG: 2 LOZENGE ORAL at 11:28

## 2023-10-17 RX ADMIN — WATER 2000 MG: 1 INJECTION INTRAMUSCULAR; INTRAVENOUS; SUBCUTANEOUS at 23:35

## 2023-10-17 RX ADMIN — LORAZEPAM 3 MG: 2 TABLET ORAL at 09:57

## 2023-10-17 RX ADMIN — MORPHINE SULFATE 2 MG: 2 INJECTION, SOLUTION INTRAMUSCULAR; INTRAVENOUS at 22:45

## 2023-10-17 RX ADMIN — LEVETIRACETAM 500 MG: 500 TABLET, FILM COATED ORAL at 12:15

## 2023-10-17 RX ADMIN — Medication 100 MG: at 07:54

## 2023-10-17 RX ADMIN — WATER 2000 MG: 1 INJECTION INTRAMUSCULAR; INTRAVENOUS; SUBCUTANEOUS at 07:54

## 2023-10-17 RX ADMIN — NICOTINE POLACRILEX 2 MG: 2 LOZENGE ORAL at 12:21

## 2023-10-17 RX ADMIN — HYDROCODONE BITARTRATE AND ACETAMINOPHEN 1 TABLET: 10; 325 TABLET ORAL at 07:54

## 2023-10-17 RX ADMIN — WATER 2000 MG: 1 INJECTION INTRAMUSCULAR; INTRAVENOUS; SUBCUTANEOUS at 15:53

## 2023-10-17 RX ADMIN — LORAZEPAM 2 MG: 2 TABLET ORAL at 23:35

## 2023-10-17 RX ADMIN — LEVETIRACETAM 500 MG: 500 TABLET, FILM COATED ORAL at 20:53

## 2023-10-17 RX ADMIN — LORAZEPAM 1 MG: 1 TABLET ORAL at 12:06

## 2023-10-17 RX ADMIN — LORAZEPAM 2 MG: 2 TABLET ORAL at 12:07

## 2023-10-17 RX ADMIN — HYDROCODONE BITARTRATE AND ACETAMINOPHEN 1 TABLET: 10; 325 TABLET ORAL at 03:19

## 2023-10-17 RX ADMIN — LEVETIRACETAM 500 MG: 100 INJECTION INTRAVENOUS at 03:07

## 2023-10-17 RX ADMIN — SODIUM CHLORIDE, PRESERVATIVE FREE 10 ML: 5 INJECTION INTRAVENOUS at 21:09

## 2023-10-17 RX ADMIN — NICOTINE POLACRILEX 2 MG: 2 LOZENGE ORAL at 15:52

## 2023-10-17 RX ADMIN — LORAZEPAM 2 MG: 2 TABLET ORAL at 20:53

## 2023-10-17 RX ADMIN — NICOTINE POLACRILEX 2 MG: 2 LOZENGE ORAL at 12:30

## 2023-10-17 RX ADMIN — LORAZEPAM 4 MG: 2 TABLET ORAL at 15:33

## 2023-10-17 RX ADMIN — LORAZEPAM 2 MG: 2 TABLET ORAL at 00:04

## 2023-10-17 RX ADMIN — DEXMEDETOMIDINE HYDROCHLORIDE 0.4 MCG/KG/HR: 400 INJECTION, SOLUTION INTRAVENOUS at 16:09

## 2023-10-17 RX ADMIN — HYDROCODONE BITARTRATE AND ACETAMINOPHEN 1 TABLET: 10; 325 TABLET ORAL at 12:06

## 2023-10-17 ASSESSMENT — PAIN SCALES - GENERAL
PAINLEVEL_OUTOF10: 7
PAINLEVEL_OUTOF10: 8
PAINLEVEL_OUTOF10: 4
PAINLEVEL_OUTOF10: 2
PAINLEVEL_OUTOF10: 5
PAINLEVEL_OUTOF10: 7

## 2023-10-17 ASSESSMENT — PAIN DESCRIPTION - ORIENTATION
ORIENTATION: LEFT
ORIENTATION: RIGHT;LEFT;POSTERIOR
ORIENTATION: LEFT
ORIENTATION: POSTERIOR

## 2023-10-17 ASSESSMENT — ENCOUNTER SYMPTOMS
VOMITING: 0
VOICE CHANGE: 0
DIARRHEA: 0
CONSTIPATION: 0
SHORTNESS OF BREATH: 0
BACK PAIN: 0
NAUSEA: 0
COLOR CHANGE: 0

## 2023-10-17 ASSESSMENT — PAIN DESCRIPTION - PAIN TYPE
TYPE: SURGICAL PAIN

## 2023-10-17 ASSESSMENT — PAIN DESCRIPTION - DESCRIPTORS
DESCRIPTORS: CRAMPING
DESCRIPTORS: SORE
DESCRIPTORS: CRAMPING
DESCRIPTORS: ACHING

## 2023-10-17 ASSESSMENT — PAIN DESCRIPTION - LOCATION
LOCATION: HEAD

## 2023-10-17 ASSESSMENT — PAIN - FUNCTIONAL ASSESSMENT
PAIN_FUNCTIONAL_ASSESSMENT: PREVENTS OR INTERFERES SOME ACTIVE ACTIVITIES AND ADLS
PAIN_FUNCTIONAL_ASSESSMENT: PREVENTS OR INTERFERES SOME ACTIVE ACTIVITIES AND ADLS
PAIN_FUNCTIONAL_ASSESSMENT: ACTIVITIES ARE NOT PREVENTED
PAIN_FUNCTIONAL_ASSESSMENT: PREVENTS OR INTERFERES WITH MANY ACTIVE NOT PASSIVE ACTIVITIES

## 2023-10-17 NOTE — PROGRESS NOTES
Hospitalist Progress Note    Patient:  Rio Cheney  YOB: 1975  Date of Service: 10/17/2023  MRN: [id number]   Acct: [de-identified]   Primary Care Physician: Matthew Banegas  Advance Directive: Full Code  Admit Date: 10/15/2023       Hospital Day: 2  Referring Provider: Edna Boss DO    Patient Seen, Chart, Consults, Notes, Labs, Radiology studies reviewed. Subjective:  Rio Cheney is a 51 y.o. male  whom we are following for epidural bleed, subdural hematoma, chronic alcoholism. He continues to do well. He may be having some mild withdrawal from alcohol. He has some unsteadiness with ambulation. Hemodynamics are stable.     Allergies:  Bactrim [sulfamethoxazole-trimethoprim]    Medicines:  Current Facility-Administered Medications   Medication Dose Route Frequency Provider Last Rate Last Admin    nicotine polacrilex (COMMIT) lozenge 2 mg  2 mg Oral Q1H PRN Gerardo Peralta MD   2 mg at 10/17/23 1230    levETIRAcetam (KEPPRA) tablet 500 mg  500 mg Oral BID Gerardo Peralta MD   500 mg at 10/17/23 1215    ceFAZolin (ANCEF) 2,000 mg in sterile water 20 mL IV syringe  2,000 mg IntraVENous Q8H Cyndi RAYGOZA MD   2,000 mg at 10/17/23 0754    HYDROcodone-acetaminophen (NORCO)  MG per tablet 1 tablet  1 tablet Oral Q4H PRN Edna Boss DO   1 tablet at 10/17/23 1206    morphine (PF) injection 2 mg  2 mg IntraVENous Q1H PRN Edna Boss DO        nicotine (NICODERM CQ) 21 MG/24HR 1 patch  1 patch TransDERmal Daily Gerardo Peralta MD   1 patch at 10/17/23 0756    sodium chloride flush 0.9 % injection 10 mL  10 mL IntraVENous 2 times per day Edna Boss DO   10 mL at 10/16/23 0900    sodium chloride flush 0.9 % injection 10 mL  10 mL IntraVENous PRN Edna Boss DO        0.9 % sodium chloride infusion   IntraVENous PRN Edna Boss DO        ondansetron (ZOFRAN-ODT) disintegrating tablet 4 mg  4 mg Oral Q8H PRN Edna Boss using dose optimization techniques as appropriate to the performed exam and include at least one of the following: Automated exposure control, adjustment of the mA and/or kV according to size, and the use of iterative reconstruction technique. ______________________________________ Electronically signed by: Shorty Arrieta D.O. Date:     10/15/2023 Time:    16:58     CT HEAD WO CONTRAST    Result Date: 10/15/2023  EXAM:  CT HEAD WITHOUT CONTRAST  HISTORY:  Brain week cancer, altered mental status, seizure, current to detox with alcohol abuse  COMPARISON:  CT head from 02/05/2022  TECHNIQUE:  Multi-slice transaxial images are acquired through the head with 2-D reconstructed images. All CT scans are performed using dose optimization techniques as appropriate to the performed exam and includes at least one of the following:  Automated exposure control, adjustment of the mA and/or kV according to size, and the use of iterative reconstruction technique. FINDINGS:  There is a large lenticular shaped extra-axial high attenuation fluid collection adjacent to the left frontal lobe, right parietal lobe, and right temporal lobe measuring up to 11.6 x 3.3 x 9.8 cm. There is significant mass effect on the subjacent brain parenchyma causing left to right midline shift of 1.4 cm. There is high attenuation hemorrhage along the right side of the interhemispheric fissure. There is additional high attenuation subdural hemorrhage along the right frontal and temporal lobe. There are cortical areas of high attenuation foci in the right frontal lobe that likely reflect contusions. There is extra-axial gas bilaterally. There is suggestion of a nondisplaced fracture of the squamous portion of the left sphenoid bone. No hydrocephalous. The gray matter interface is maintained. Minimal bilateral mastoid effusions are suggested. The paranasal sinuses are grossly clear.        1. Large left frontal/parietal/temporal epidural

## 2023-10-17 NOTE — PROGRESS NOTES
Facility/Department: [facility] ICU  Initial Speech/Language/Cognitive/Swallow Assessment    NAME: Mayra Steele  : 1975   MRN: [id number]  ADMISSION DATE: 10/15/2023  ADMITTING DIAGNOSIS: has Alcohol withdrawal seizure with complication ([address]); KATHLEEN (generalized anxiety disorder); NICHOLAS (acute kidney injury) ([address]); Transaminitis; Alcohol intoxication in active alcoholic without complication ([address]); Nicotine dependence; and Intracranial epidural hematoma (HCC) on their problem list.    Date of Eval: 10/17/2023   Evaluating Therapist: AUDREY Petit    Pain:  Pain Assessment  Pain Assessment: 0-10  Pain Level: 5  Patient's Stated Pain Goal: 0 - No pain  Pain Location: Head  Pain Orientation: Right, Left, Posterior  Pain Descriptors: Cramping  Functional Pain Assessment: Prevents or interferes some active activities and ADLs  Pain Type: Surgical pain  Pain Frequency: Continuous  Pain Onset: On-going  Non-Pharmaceutical Pain Intervention(s): Repositioned    Assessment:  Completed assessment. Patient exhibited decreased volume of speech during utterances. Patient also exhibited decreased select and sustained attention, slow processing, delayed and decreased auditory comprehension (particularly as length and complexity of information increases), moderately delayed verbalizations, and decreased immediate/short-term memory. Also evaluated patient's swallowing function. Patient exhibited slow, decreased oral prep of more solid consistencies as well as sluggish, mildly decreased laryngeal elevation for swallow airway protection. Even so, no outward S/S penetration/aspiration was noted with any regular solid consistency trial or thin H2O trial presented during assessment this date. At this time, would continue regular solid consistency with thin liquids. Will continue to follow. Thank you for this consult.      Goals:  Short-term Goals  Timeframe for Short-term Goals: 1-2x/day for 3 days  Goal 1: Patient will tolerate

## 2023-10-17 NOTE — PROGRESS NOTES
Physician Progress Note      PATIENT:               Naeem Damian  CSN #:                  [id number]  :                       1975  ADMIT DATE:       10/15/2023 4:22 PM  [address] [facility] DATE:  RESPONDING  PROVIDER #:        Macy Joshi MD          QUERY TEXT:    Pt admitted with large acute epidural hematoma. Pt noted to have drop in   hemoglobin. If possible, please document in the progress notes and discharge   summary if you are evaluating and/or treating any of the following: The medical record reflects the following:  Risk Factors: Fall, Surgery  Clinical Indicators: Drop in hgb 15.6 to 10.6. , Surgery  Treatment: Labs, Monitor , 1 unit platelets, IVF 75 ml/hr    Thank you  Natacha Child RN, BSN, [facility]  [phone]  Options provided:  -- Acute blood loss anemia  -- Postoperative acute blood loss anemia  -- Dilutional anemia  -- Precipitous drop in Hemoglobin and Hematocrit  -- Other - I will add my own diagnosis  -- Disagree - Not applicable / Not valid  -- Disagree - Clinically unable to determine / Unknown  -- Refer to Clinical Documentation Reviewer    PROVIDER RESPONSE TEXT:    This patient has postoperative acute blood loss anemia.     Query created by: Natacha Child on 10/17/2023 11:21 AM      Electronically signed by:  Macy Joshi MD 10/17/2023 11:32 AM

## 2023-10-17 NOTE — PROGRESS NOTES
Pt is awake and alert. Talking on the phone with his mother. This [birth year] birthday. Ambulated out of the room to the desk approx 20 feet. Wobbly unsteady.  Tremors

## 2023-10-17 NOTE — CARE COORDINATION
Unable to do assessment, pt not able to participate. Pt is post op crani, very confused. No family in room. Will re-assess when pt more medically stable.   Electronically signed by Bhanu Hammer RN on 10/17/2023 at 3:53 PM

## 2023-10-18 LAB
ANION GAP SERPL CALCULATED.3IONS-SCNC: 9 MMOL/L (ref 7–19)
BASOPHILS # BLD: 0 K/UL (ref 0–0.2)
BASOPHILS NFR BLD: 0.3 % (ref 0–1)
BUN SERPL-MCNC: 9 MG/DL (ref 6–20)
CALCIUM SERPL-MCNC: 8.7 MG/DL (ref 8.6–10)
CHLORIDE SERPL-SCNC: 104 MMOL/L (ref 98–111)
CO2 SERPL-SCNC: 24 MMOL/L (ref 22–29)
CREAT SERPL-MCNC: 0.5 MG/DL (ref 0.5–1.2)
EOSINOPHIL # BLD: 0.1 K/UL (ref 0–0.6)
EOSINOPHIL NFR BLD: 1.2 % (ref 0–5)
ERYTHROCYTE [DISTWIDTH] IN BLOOD BY AUTOMATED COUNT: 12.3 % (ref 11.5–14.5)
GLUCOSE SERPL-MCNC: 103 MG/DL (ref 74–109)
HCT VFR BLD AUTO: 29.5 % (ref 42–52)
HGB BLD-MCNC: 10.1 G/DL (ref 14–18)
IMM GRANULOCYTES # BLD: 0.1 K/UL
LYMPHOCYTES # BLD: 1.6 K/UL (ref 1.1–4.5)
LYMPHOCYTES NFR BLD: 20.1 % (ref 20–40)
MCH RBC QN AUTO: 34.5 PG (ref 27–31)
MCHC RBC AUTO-ENTMCNC: 34.2 G/DL (ref 33–37)
MCV RBC AUTO: 100.7 FL (ref 80–94)
MONOCYTES # BLD: 0.4 K/UL (ref 0–0.9)
MONOCYTES NFR BLD: 5.4 % (ref 0–10)
NEUTROPHILS # BLD: 5.6 K/UL (ref 1.5–7.5)
NEUTS SEG NFR BLD: 72.4 % (ref 50–65)
PLATELET # BLD AUTO: 101 K/UL (ref 130–400)
PMV BLD AUTO: 11.3 FL (ref 9.4–12.4)
POTASSIUM SERPL-SCNC: 3.7 MMOL/L (ref 3.5–5)
RBC # BLD AUTO: 2.93 M/UL (ref 4.7–6.1)
SODIUM SERPL-SCNC: 137 MMOL/L (ref 136–145)
WBC # BLD AUTO: 7.7 K/UL (ref 4.8–10.8)

## 2023-10-18 PROCEDURE — 99024 POSTOP FOLLOW-UP VISIT: CPT | Performed by: NEUROLOGICAL SURGERY

## 2023-10-18 PROCEDURE — 6360000002 HC RX W HCPCS: Performed by: INTERNAL MEDICINE

## 2023-10-18 PROCEDURE — 2500000003 HC RX 250 WO HCPCS: Performed by: INTERNAL MEDICINE

## 2023-10-18 PROCEDURE — 85025 COMPLETE CBC W/AUTO DIFF WBC: CPT

## 2023-10-18 PROCEDURE — 94760 N-INVAS EAR/PLS OXIMETRY 1: CPT

## 2023-10-18 PROCEDURE — 6370000000 HC RX 637 (ALT 250 FOR IP): Performed by: INTERNAL MEDICINE

## 2023-10-18 PROCEDURE — 80048 BASIC METABOLIC PNL TOTAL CA: CPT

## 2023-10-18 PROCEDURE — 6360000002 HC RX W HCPCS: Performed by: NEUROLOGICAL SURGERY

## 2023-10-18 PROCEDURE — 2000000000 HC ICU R&B

## 2023-10-18 PROCEDURE — 36415 COLL VENOUS BLD VENIPUNCTURE: CPT

## 2023-10-18 PROCEDURE — 6370000000 HC RX 637 (ALT 250 FOR IP): Performed by: NEUROLOGICAL SURGERY

## 2023-10-18 RX ORDER — MULTIVITAMIN WITH IRON
1 TABLET ORAL DAILY
Status: DISCONTINUED | OUTPATIENT
Start: 2023-10-18 | End: 2023-10-21 | Stop reason: HOSPADM

## 2023-10-18 RX ADMIN — LEVETIRACETAM 500 MG: 500 TABLET, FILM COATED ORAL at 19:55

## 2023-10-18 RX ADMIN — THERA TABS 1 TABLET: TAB at 16:32

## 2023-10-18 RX ADMIN — DEXMEDETOMIDINE HYDROCHLORIDE 0.4 MCG/KG/HR: 400 INJECTION, SOLUTION INTRAVENOUS at 02:10

## 2023-10-18 RX ADMIN — LORAZEPAM 2 MG: 2 INJECTION INTRAMUSCULAR; INTRAVENOUS at 16:32

## 2023-10-18 RX ADMIN — HYDROCODONE BITARTRATE AND ACETAMINOPHEN 1 TABLET: 10; 325 TABLET ORAL at 19:53

## 2023-10-18 RX ADMIN — DEXMEDETOMIDINE HYDROCHLORIDE 1.3 MCG/KG/HR: 400 INJECTION, SOLUTION INTRAVENOUS at 19:56

## 2023-10-18 RX ADMIN — LORAZEPAM 2 MG: 2 TABLET ORAL at 19:55

## 2023-10-18 RX ADMIN — LEVETIRACETAM 500 MG: 500 TABLET, FILM COATED ORAL at 07:57

## 2023-10-18 RX ADMIN — MORPHINE SULFATE 2 MG: 2 INJECTION, SOLUTION INTRAMUSCULAR; INTRAVENOUS at 23:21

## 2023-10-18 RX ADMIN — LORAZEPAM 2 MG: 2 TABLET ORAL at 08:01

## 2023-10-18 RX ADMIN — DEXMEDETOMIDINE HYDROCHLORIDE 1.2 MCG/KG/HR: 400 INJECTION, SOLUTION INTRAVENOUS at 15:21

## 2023-10-18 RX ADMIN — Medication 100 MG: at 07:58

## 2023-10-18 RX ADMIN — DEXMEDETOMIDINE HYDROCHLORIDE 1.2 MCG/KG/HR: 400 INJECTION, SOLUTION INTRAVENOUS at 12:00

## 2023-10-18 RX ADMIN — FOLIC ACID 1 MG: 1 TABLET ORAL at 07:57

## 2023-10-18 RX ADMIN — LORAZEPAM 2 MG: 2 TABLET ORAL at 04:01

## 2023-10-18 ASSESSMENT — PAIN SCALES - GENERAL
PAINLEVEL_OUTOF10: 10
PAINLEVEL_OUTOF10: 6
PAINLEVEL_OUTOF10: 0
PAINLEVEL_OUTOF10: 8
PAINLEVEL_OUTOF10: 3
PAINLEVEL_OUTOF10: 0

## 2023-10-18 ASSESSMENT — PAIN DESCRIPTION - ORIENTATION
ORIENTATION: LEFT
ORIENTATION: MID

## 2023-10-18 ASSESSMENT — PAIN DESCRIPTION - DESCRIPTORS
DESCRIPTORS: ACHING
DESCRIPTORS: ACHING
DESCRIPTORS: SORE
DESCRIPTORS: POUNDING

## 2023-10-18 ASSESSMENT — PAIN DESCRIPTION - LOCATION
LOCATION: HEAD

## 2023-10-18 ASSESSMENT — PAIN DESCRIPTION - ONSET: ONSET: PROGRESSIVE

## 2023-10-18 ASSESSMENT — PAIN - FUNCTIONAL ASSESSMENT: PAIN_FUNCTIONAL_ASSESSMENT: ACTIVITIES ARE NOT PREVENTED

## 2023-10-18 ASSESSMENT — PAIN DESCRIPTION - PAIN TYPE: TYPE: SURGICAL PAIN

## 2023-10-18 ASSESSMENT — PAIN DESCRIPTION - FREQUENCY: FREQUENCY: INTERMITTENT

## 2023-10-18 NOTE — PROGRESS NOTES
Physical Therapy  Name: Darrel Nicole  MRN:  [id number]  Date of service:  10/18/2023    Per RN, Thuy Bolanos pt sleeping soundly. Will f/u at a later time.     Electronically signed by Sue Oates PT on 10/18/2023 at 1:02 PM

## 2023-10-18 NOTE — PROGRESS NOTES
Patient increasing in anxiety and agitation. Very restless, stating he is okay to leave the hospital and he can walk on his own. CIWA score assessed (see flowsheet). Dr. Hugo Place contacted. Precedex order given. Electronically signed by Kolton Santos RN on 10/17/2023 at 7:46 PM      [phone] Patient continues to increase in restlessness and need to leave the hospital. Reoriented patient. Increased precedex drip. 1800: Patient sleeping and resting comfortably in chair.

## 2023-10-18 NOTE — PROGRESS NOTES
Pt had witnessed fall while agitated and try to get out of chair. While returning to room, I witnessed patient remove Iv and stand up, and fall to hands and knees. Pt did not hit head and had no loc. Pt has no complaints of pain.

## 2023-10-18 NOTE — PLAN OF CARE
Problem: Discharge Planning  Goal: Discharge to home or other facility with appropriate resources  Outcome: Not Progressing     Problem: Safety - Adult  Goal: Free from fall injury  Outcome: Not Progressing     Problem: Musculoskeletal - Adult  Goal: Return mobility to safest level of function  Outcome: Not Progressing     Problem: Anxiety  Goal: Will report anxiety at manageable levels  Description: INTERVENTIONS:  1. Administer medication as ordered  2. Teach and rehearse alternative coping skills  3. Provide emotional support with 1:1 interaction with staff  Outcome: Not Progressing     Problem: Coping  Goal: Pt/Family able to verbalize concerns and demonstrate effective coping strategies  Description: INTERVENTIONS:  1. Assist patient/family to identify coping skills, available support systems and cultural and spiritual values  2. Provide emotional support, including active listening and acknowledgement of concerns of patient and caregivers  3. Reduce environmental stimuli, as able  4. Instruct patient/family in relaxation techniques, as appropriate  5. Assess for spiritual pain/suffering and initiate Spiritual Care, Psychosocial Clinical Specialist consults as needed  Outcome: Not Progressing     Problem: Discharge Planning  Goal: Discharge to home or other facility with appropriate resources  Outcome: Not Progressing     Problem: Safety - Adult  Goal: Free from fall injury  Outcome: Not Progressing     Problem: Musculoskeletal - Adult  Goal: Return mobility to safest level of function  Outcome: Not Progressing     Problem: Anxiety  Goal: Will report anxiety at manageable levels  Description: INTERVENTIONS:  1. Administer medication as ordered  2. Teach and rehearse alternative coping skills  3.  Provide emotional support with 1:1 interaction with staff  Outcome: Not Progressing     Problem: Coping  Goal: Pt/Family able to verbalize concerns and demonstrate effective coping strategies  Description: INTERVENTIONS:  1. Assist patient/family to identify coping skills, available support systems and cultural and spiritual values  2. Provide emotional support, including active listening and acknowledgement of concerns of patient and caregivers  3. Reduce environmental stimuli, as able  4. Instruct patient/family in relaxation techniques, as appropriate  5.  Assess for spiritual pain/suffering and initiate Spiritual Care, Psychosocial Clinical Specialist consults as needed  Outcome: Not Progressing

## 2023-10-18 NOTE — PROGRESS NOTES
Physical Therapy  Name: Santi Monahan  MRN:  [id number]  Date of service:  10/18/2023    Pt. saving severe headache, per RODGER Marina. Will f/u at a later time.     Electronically signed by Marcos El PT on 10/18/2023 at 9:44 AM

## 2023-10-18 NOTE — PROGRESS NOTES
[facility] and Dr. Ezio Mccrary notified about fall. No orders received from physician.

## 2023-10-19 LAB
ANION GAP SERPL CALCULATED.3IONS-SCNC: 13 MMOL/L (ref 7–19)
BASOPHILS # BLD: 0 K/UL (ref 0–0.2)
BASOPHILS NFR BLD: 0.3 % (ref 0–1)
BUN SERPL-MCNC: 9 MG/DL (ref 6–20)
CALCIUM SERPL-MCNC: 8.7 MG/DL (ref 8.6–10)
CHLORIDE SERPL-SCNC: 103 MMOL/L (ref 98–111)
CO2 SERPL-SCNC: 21 MMOL/L (ref 22–29)
CREAT SERPL-MCNC: 0.5 MG/DL (ref 0.5–1.2)
EOSINOPHIL # BLD: 0.1 K/UL (ref 0–0.6)
EOSINOPHIL NFR BLD: 1.9 % (ref 0–5)
ERYTHROCYTE [DISTWIDTH] IN BLOOD BY AUTOMATED COUNT: 12.2 % (ref 11.5–14.5)
GLUCOSE SERPL-MCNC: 143 MG/DL (ref 74–109)
HCT VFR BLD AUTO: 27.2 % (ref 42–52)
HGB BLD-MCNC: 9.4 G/DL (ref 14–18)
IMM GRANULOCYTES # BLD: 0 K/UL
LYMPHOCYTES # BLD: 1.5 K/UL (ref 1.1–4.5)
LYMPHOCYTES NFR BLD: 23.7 % (ref 20–40)
MAGNESIUM SERPL-MCNC: 2.2 MG/DL (ref 1.6–2.6)
MCH RBC QN AUTO: 35.1 PG (ref 27–31)
MCHC RBC AUTO-ENTMCNC: 34.6 G/DL (ref 33–37)
MCV RBC AUTO: 101.5 FL (ref 80–94)
MONOCYTES # BLD: 0.5 K/UL (ref 0–0.9)
MONOCYTES NFR BLD: 7.4 % (ref 0–10)
NEUTROPHILS # BLD: 4.2 K/UL (ref 1.5–7.5)
NEUTS SEG NFR BLD: 66.2 % (ref 50–65)
PLATELET # BLD AUTO: 106 K/UL (ref 130–400)
PMV BLD AUTO: 11.6 FL (ref 9.4–12.4)
POTASSIUM SERPL-SCNC: 3.9 MMOL/L (ref 3.5–5)
RBC # BLD AUTO: 2.68 M/UL (ref 4.7–6.1)
SODIUM SERPL-SCNC: 137 MMOL/L (ref 136–145)
WBC # BLD AUTO: 6.3 K/UL (ref 4.8–10.8)

## 2023-10-19 PROCEDURE — 6360000002 HC RX W HCPCS: Performed by: INTERNAL MEDICINE

## 2023-10-19 PROCEDURE — 99024 POSTOP FOLLOW-UP VISIT: CPT | Performed by: NEUROLOGICAL SURGERY

## 2023-10-19 PROCEDURE — 6370000000 HC RX 637 (ALT 250 FOR IP): Performed by: INTERNAL MEDICINE

## 2023-10-19 PROCEDURE — 6370000000 HC RX 637 (ALT 250 FOR IP): Performed by: NEUROLOGICAL SURGERY

## 2023-10-19 PROCEDURE — 85025 COMPLETE CBC W/AUTO DIFF WBC: CPT

## 2023-10-19 PROCEDURE — 2500000003 HC RX 250 WO HCPCS: Performed by: INTERNAL MEDICINE

## 2023-10-19 PROCEDURE — 6360000002 HC RX W HCPCS: Performed by: NEUROLOGICAL SURGERY

## 2023-10-19 PROCEDURE — 80048 BASIC METABOLIC PNL TOTAL CA: CPT

## 2023-10-19 PROCEDURE — 97165 OT EVAL LOW COMPLEX 30 MIN: CPT

## 2023-10-19 PROCEDURE — 1210000000 HC MED SURG R&B

## 2023-10-19 PROCEDURE — 97116 GAIT TRAINING THERAPY: CPT

## 2023-10-19 PROCEDURE — 2580000003 HC RX 258: Performed by: INTERNAL MEDICINE

## 2023-10-19 PROCEDURE — 97162 PT EVAL MOD COMPLEX 30 MIN: CPT

## 2023-10-19 PROCEDURE — 83735 ASSAY OF MAGNESIUM: CPT

## 2023-10-19 PROCEDURE — 97530 THERAPEUTIC ACTIVITIES: CPT

## 2023-10-19 PROCEDURE — 36415 COLL VENOUS BLD VENIPUNCTURE: CPT

## 2023-10-19 RX ORDER — QUETIAPINE FUMARATE 25 MG/1
25 TABLET, FILM COATED ORAL NIGHTLY PRN
Status: DISCONTINUED | OUTPATIENT
Start: 2023-10-19 | End: 2023-10-21 | Stop reason: HOSPADM

## 2023-10-19 RX ADMIN — Medication 100 MG: at 08:48

## 2023-10-19 RX ADMIN — HYDROCODONE BITARTRATE AND ACETAMINOPHEN 1 TABLET: 10; 325 TABLET ORAL at 23:43

## 2023-10-19 RX ADMIN — LORAZEPAM 2 MG: 2 INJECTION INTRAMUSCULAR; INTRAVENOUS at 00:15

## 2023-10-19 RX ADMIN — LORAZEPAM 2 MG: 2 TABLET ORAL at 17:49

## 2023-10-19 RX ADMIN — QUETIAPINE FUMARATE 25 MG: 25 TABLET ORAL at 20:24

## 2023-10-19 RX ADMIN — LEVETIRACETAM 500 MG: 500 TABLET, FILM COATED ORAL at 08:48

## 2023-10-19 RX ADMIN — FOLIC ACID 1 MG: 1 TABLET ORAL at 08:48

## 2023-10-19 RX ADMIN — LORAZEPAM 2 MG: 2 INJECTION INTRAMUSCULAR; INTRAVENOUS at 16:02

## 2023-10-19 RX ADMIN — THERA TABS 1 TABLET: TAB at 08:48

## 2023-10-19 RX ADMIN — WATER 10 MG: 1 INJECTION INTRAMUSCULAR; INTRAVENOUS; SUBCUTANEOUS at 08:47

## 2023-10-19 RX ADMIN — MORPHINE SULFATE 2 MG: 2 INJECTION, SOLUTION INTRAMUSCULAR; INTRAVENOUS at 07:30

## 2023-10-19 RX ADMIN — HYDROCODONE BITARTRATE AND ACETAMINOPHEN 1 TABLET: 10; 325 TABLET ORAL at 17:46

## 2023-10-19 RX ADMIN — LEVETIRACETAM 500 MG: 500 TABLET, FILM COATED ORAL at 20:24

## 2023-10-19 RX ADMIN — DEXMEDETOMIDINE HYDROCHLORIDE 1.4 MCG/KG/HR: 400 INJECTION, SOLUTION INTRAVENOUS at 00:04

## 2023-10-19 RX ADMIN — DEXMEDETOMIDINE HYDROCHLORIDE 0.6 MCG/KG/HR: 400 INJECTION, SOLUTION INTRAVENOUS at 08:09

## 2023-10-19 RX ADMIN — SODIUM CHLORIDE, PRESERVATIVE FREE 10 ML: 5 INJECTION INTRAVENOUS at 20:23

## 2023-10-19 RX ADMIN — DEXMEDETOMIDINE HYDROCHLORIDE 1.4 MCG/KG/HR: 400 INJECTION, SOLUTION INTRAVENOUS at 03:46

## 2023-10-19 RX ADMIN — NICOTINE POLACRILEX 2 MG: 2 LOZENGE ORAL at 23:46

## 2023-10-19 ASSESSMENT — PAIN SCALES - GENERAL
PAINLEVEL_OUTOF10: 5
PAINLEVEL_OUTOF10: 0
PAINLEVEL_OUTOF10: 9
PAINLEVEL_OUTOF10: 5
PAINLEVEL_OUTOF10: 10

## 2023-10-19 ASSESSMENT — PAIN DESCRIPTION - ORIENTATION: ORIENTATION: LEFT

## 2023-10-19 ASSESSMENT — PAIN - FUNCTIONAL ASSESSMENT: PAIN_FUNCTIONAL_ASSESSMENT: ACTIVITIES ARE NOT PREVENTED

## 2023-10-19 ASSESSMENT — PAIN DESCRIPTION - DESCRIPTORS: DESCRIPTORS: ACHING

## 2023-10-19 ASSESSMENT — PAIN DESCRIPTION - LOCATION
LOCATION: HEAD
LOCATION: HEAD;NECK

## 2023-10-19 NOTE — PROGRESS NOTES
Hospitalist Progress Note  [facility]     Patient: Madeleine Randhawa  : 1975  MRN: [id number]  Code Status: Full Code    Hospital Day: 4   Date of Service: 10/19/2023    Subjective:   Patient seen and examined. No current complaints. Past Medical History:   Diagnosis Date    Alcohol abuse     Anxiety     Psychiatric problem     Seizures ([address])        Past Surgical History:   Procedure Laterality Date    CRANIOTOMY Left 10/15/2023    LEFT SIDED CRANIOTOMY FOR EPIDURAL HEMATOMA performed by Nola Caballero MD at [address] [facility]         No family history on file.     Social History     Socioeconomic History    Marital status:      Spouse name: Not on file    Number of children: Not on file    Years of education: Not on file    Highest education level: Not on file   Occupational History    Not on file   Tobacco Use    Smoking status: Every Day     Types: Cigars    Smokeless tobacco: Never   Vaping Use    Vaping Use: Never used   Substance and Sexual Activity    Alcohol use: Yes     Comment: 2 pints of whiskey daily    Drug use: Never    Sexual activity: Not on file   Other Topics Concern    Not on file   Social History Narrative    Not on file     Social Determinants of Health     Financial Resource Strain: Not on file   Food Insecurity: Not on file   Transportation Needs: Not on file   Physical Activity: Not on file   Stress: Not on file   Social Connections: Not on file   Intimate Partner Violence: Not on file   Housing Stability: Not on file       Current Facility-Administered Medications   Medication Dose Route Frequency Provider Last Rate Last Admin    QUEtiapine (SEROQUEL) tablet 25 mg  25 mg Oral Nightly PRN Nola Caballero MD        multivitamin 1 tablet  1 tablet Oral Daily Deneen Carpenter MD   1 tablet at 10/19/23 0881    nicotine polacrilex (COMMIT) lozenge 2 mg  2 mg Oral Q1H PRN Nola Caballero MD   2 mg at 10/17/23 5008    levETIRAcetam dependence admitted to critical care unit for acute left epidural hematoma. Neurosurgery following  S/p evacuation of epidural hematoma 10/15/2023  Drain since removed  Keppra  CIWA protocol  Trial off Precedex gtt  Daily thiamine/folate/MVI  PT/OT/SLP  SCDs for DVT prophylaxis  Discussed treatment plan with patient/RN    Total critical care time: 43 minutes    The above note was generated using voice recognition software. Inadvertent typographical errors in transcription may have occurred.     Arturo Green MD   10/19/2023 2:08 PM

## 2023-10-19 NOTE — PROGRESS NOTES
Maryanne Schwartz transferred to [phone] from [facility] via wheelchair. Reason for transfer: to 5th floor room   Explained reason for transfer to Patient. Belongings:  cell phone and clothes  with patient at bedside . Soft chart transferred with patient: Yes. Telemetry box number *** transferred with patient: yes. Report given to: Shruthi, via telephone.       Electronically signed by Sabrina Obrien RN on 10/19/2023 at 3:31 PM

## 2023-10-19 NOTE — PROGRESS NOTES
Occupational Therapy  Facility/Department: [facility] ICU  Occupational Therapy Initial Assessment    Name: Jade Yepez  : 1975  MRN: [id number]  Date of Service: 10/19/2023    Discharge Recommendations:  Patient would benefit from continued therapy after discharge          Patient Diagnosis(es): The primary encounter diagnosis was Intracranial epidural hematoma ([address]). A diagnosis of Subdural hemorrhage (HCC) was also pertinent to this visit. Past Medical History:  has a past medical history of Alcohol abuse, Anxiety, Psychiatric problem, and Seizures ([address]). Past Surgical History:  has a past surgical history that includes eye surgery; orthopedic surgery; and craniotomy (Left, 10/15/2023). Treatment Diagnosis: Intracraninal epidural hematoma, SP craniotomy      Assessment   Performance deficits / Impairments: Decreased functional mobility ; Decreased ADL status; Decreased balance  Assessment: Patient willing to participate. Patient transferred around room and in hallway with RW, cues required for staying inside walker and to reach back for chair when going to sit  Treatment Diagnosis: Intracraninal epidural hematoma, SP craniotomy  Prognosis: Good  Decision Making: Low Complexity  Activity Tolerance  Activity Tolerance: Patient Tolerated treatment well        Plan   Occupational Therapy Plan  Times Per Week: 3-5  Times Per Day:  Once a day     Restrictions  Restrictions/Precautions  Restrictions/Precautions: Fall Risk    Subjective   General  Chart Reviewed: Yes  Patient assessed for rehabilitation services?: Yes  Additional Pertinent Hx: Seizures  Family / Caregiver Present: No  Diagnosis: Intracraninal epidural hematoma, SP craniotomy     Social/Functional History  Social/Functional History  Lives With: Alone  ADL Assistance: Independent  Homemaking Assistance: Independent  Ambulation Assistance: Independent  Transfer Assistance: Independent       Objective   Pulse: 100  Heart Rate Source: Monitor  BP: 105/73  BP Location: Left upper arm  BP Method: Automatic  Patient Position: Turns self  MAP (Calculated): 84  Respirations: 13  SpO2: 91 %  O2 Device: None (Room air)             Safety Devices  Type of Devices: Call light within reach; Left in chair        AROM: Generally decreased, functional  Strength: Generally decreased, functional  ADL  Feeding: Independent  Grooming: Independent  UE Bathing: Contact guard assistance  LE Bathing: Moderate assistance  UE Dressing: Contact guard assistance  LE Dressing: Maximum assistance  Toileting: Moderate assistance        Bed mobility  Supine to Sit: Stand by assistance (Head of bed elevated)  Transfers  Stand Step Transfers: Minimal assistance;Contact guard assistance  Sit to stand: Minimal assistance;Contact guard assistance  Vision  Vision: Impaired (Swelling around left eye)  Hearing  Hearing: Within functional limits  Cognition  Overall Cognitive Status: Exceptions  Following Commands:  Follows one step commands with repetition  Safety Judgement: Decreased awareness of need for assistance  Orientation  Overall Orientation Status: Within Functional Limits                                           G-Code     OutComes Score                                                  AM-PAC Score             Tinneti Score       Goals  Short Term Goals  Time Frame for Short Term Goals: 1 week  Short Term Goal 1: LB dressing Min A  Short Term Goal 2: Toilet transfers SBA with RW  Short Term Goal 3: Toileting tasks CGA  Long Term Goals  Long Term Goal 1: Return to PLOF       Therapy Time   Individual Concurrent Group Co-treatment   Time In           Time Out           Minutes                   Caretha Poster

## 2023-10-19 NOTE — PROGRESS NOTES
Comments: use of call light, staff A for all mobility  Education Method: Verbal;Demonstration  Barriers to Learning: Cognition  Education Outcome: Continued education needed; Unable to verbalize; Unable to demonstrate understanding      Therapy Time   Individual Concurrent Group Co-treatment   Time In           Time Out           Minutes                   Elisha Madden PT   Electronically signed by Elisha Madden PT on 10/19/2023 at 2:56 PM

## 2023-10-19 NOTE — PROGRESS NOTES
Comprehensive Nutrition Assessment    Type and Reason for Visit:  Initial, RD Nutrition Re-Screen/LOS    Nutrition Recommendations/Plan:   Continue to follow for po intake     Malnutrition Assessment:  Malnutrition Status: At risk for malnutrition (Comment) (10/19/23 1126)    Context:  Acute Illness     Findings of the 6 clinical characteristics of malnutrition:  Energy Intake:  50% or less of estimated energy requirements for 5 or more days  Weight Loss:  No significant weight loss     Body Fat Loss:  No significant body fat loss     Muscle Mass Loss:  No significant muscle mass loss    Fluid Accumulation:  No significant fluid accumulation Extremities   Strength:  Not Performed    Nutrition Assessment:    Following patient for LOS x 4 days. Pt is receivng a Regular diet. However po intake has remained poor. Pt asleep at time of visit, breakfast tray sitting on bedside table. Per nursing, pt is more interested in sleeping than eating. Has had a weight loss of 31# or 15.7% loss of UBW in a year. Nutrition Related Findings:      Wound Type: Surgical Incision (blister)       Current Nutrition Intake & Therapies:    Average Meal Intake: 1-25%  Average Supplements Intake: None Ordered  ADULT DIET; Regular    Anthropometric Measures:  Height: 185.4 cm (6' 1\")  Ideal Body Weight (IBW): 184 lbs (84 kg)    Admission Body Weight: 88.5 kg (195 lb)  Current Body Weight: 75.3 kg (166 lb 0.1 oz),   IBW. Current BMI (kg/m2): 21.9  Usual Body Weight: 89.4 kg (197 lb)  % Weight Change (Calculated): -15.7  BMI Categories: Normal Weight (BMI 18.5-24. 9)    Estimated Daily Nutrient Needs:  Energy Requirements Based On: Kcal/kg  Weight Used for Energy Requirements: Current  Energy (kcal/day): 5321-8807 kcals (20-25 kcals/kg)  Weight Used for Protein Requirements: Current  Protein (g/day): 113g  Method Used for Fluid Requirements: 1 ml/kcal  Fluid (ml/day): 7060-8956 ml    Nutrition Diagnosis:   Inadequate oral intake,

## 2023-10-19 NOTE — PLAN OF CARE
Nutrition Problem #1: Inadequate oral intake, Unintended weight loss  Intervention: Food and/or Nutrient Delivery: Continue Current Diet  Nutritional

## 2023-10-20 ENCOUNTER — APPOINTMENT (OUTPATIENT)
Dept: CT IMAGING | Age: 48
End: 2023-10-20
Payer: MEDICAID

## 2023-10-20 LAB
ANION GAP SERPL CALCULATED.3IONS-SCNC: 11 MMOL/L (ref 7–19)
APTT PPP: 27.5 SEC (ref 26–36.2)
BASOPHILS # BLD: 0 K/UL (ref 0–0.2)
BASOPHILS NFR BLD: 0.3 % (ref 0–1)
BUN SERPL-MCNC: 15 MG/DL (ref 6–20)
CALCIUM SERPL-MCNC: 8.4 MG/DL (ref 8.6–10)
CHLORIDE SERPL-SCNC: 102 MMOL/L (ref 98–111)
CLOSURE TME BLD-IMP: NORMAL
CLOSURE TME COLL+EPINEP BLD: 96 SEC (ref 84–176)
CO2 SERPL-SCNC: 20 MMOL/L (ref 22–29)
CREAT SERPL-MCNC: 0.6 MG/DL (ref 0.5–1.2)
EOSINOPHIL # BLD: 0.1 K/UL (ref 0–0.6)
EOSINOPHIL NFR BLD: 0.8 % (ref 0–5)
ERYTHROCYTE [DISTWIDTH] IN BLOOD BY AUTOMATED COUNT: 12.3 % (ref 11.5–14.5)
GLUCOSE SERPL-MCNC: 107 MG/DL (ref 74–109)
HCT VFR BLD AUTO: 26 % (ref 42–52)
HGB BLD-MCNC: 8.7 G/DL (ref 14–18)
IMM GRANULOCYTES # BLD: 0 K/UL
INR PPP: 1 (ref 0.88–1.18)
LYMPHOCYTES # BLD: 1.6 K/UL (ref 1.1–4.5)
LYMPHOCYTES NFR BLD: 22.2 % (ref 20–40)
MAGNESIUM SERPL-MCNC: 2.3 MG/DL (ref 1.6–2.6)
MCH RBC QN AUTO: 34.5 PG (ref 27–31)
MCHC RBC AUTO-ENTMCNC: 33.5 G/DL (ref 33–37)
MCV RBC AUTO: 103.2 FL (ref 80–94)
MONOCYTES # BLD: 0.9 K/UL (ref 0–0.9)
MONOCYTES NFR BLD: 12 % (ref 0–10)
NEUTROPHILS # BLD: 4.7 K/UL (ref 1.5–7.5)
NEUTS SEG NFR BLD: 64.4 % (ref 50–65)
PLATELET # BLD AUTO: 142 K/UL (ref 130–400)
PMV BLD AUTO: 10.8 FL (ref 9.4–12.4)
POTASSIUM SERPL-SCNC: 3.8 MMOL/L (ref 3.5–5)
PROTHROMBIN TIME: 12.9 SEC (ref 12–14.6)
RBC # BLD AUTO: 2.52 M/UL (ref 4.7–6.1)
SODIUM SERPL-SCNC: 133 MMOL/L (ref 136–145)
WBC # BLD AUTO: 7.2 K/UL (ref 4.8–10.8)

## 2023-10-20 PROCEDURE — 97116 GAIT TRAINING THERAPY: CPT

## 2023-10-20 PROCEDURE — 70450 CT HEAD/BRAIN W/O DYE: CPT

## 2023-10-20 PROCEDURE — 6360000002 HC RX W HCPCS: Performed by: NEUROLOGICAL SURGERY

## 2023-10-20 PROCEDURE — 2580000003 HC RX 258: Performed by: INTERNAL MEDICINE

## 2023-10-20 PROCEDURE — 85576 BLOOD PLATELET AGGREGATION: CPT

## 2023-10-20 PROCEDURE — 36415 COLL VENOUS BLD VENIPUNCTURE: CPT

## 2023-10-20 PROCEDURE — 94760 N-INVAS EAR/PLS OXIMETRY 1: CPT

## 2023-10-20 PROCEDURE — 6370000000 HC RX 637 (ALT 250 FOR IP): Performed by: NEUROLOGICAL SURGERY

## 2023-10-20 PROCEDURE — 6370000000 HC RX 637 (ALT 250 FOR IP): Performed by: INTERNAL MEDICINE

## 2023-10-20 PROCEDURE — 85025 COMPLETE CBC W/AUTO DIFF WBC: CPT

## 2023-10-20 PROCEDURE — 85610 PROTHROMBIN TIME: CPT

## 2023-10-20 PROCEDURE — 85730 THROMBOPLASTIN TIME PARTIAL: CPT

## 2023-10-20 PROCEDURE — 99024 POSTOP FOLLOW-UP VISIT: CPT | Performed by: NEUROLOGICAL SURGERY

## 2023-10-20 PROCEDURE — 1210000000 HC MED SURG R&B

## 2023-10-20 PROCEDURE — 80048 BASIC METABOLIC PNL TOTAL CA: CPT

## 2023-10-20 PROCEDURE — 83735 ASSAY OF MAGNESIUM: CPT

## 2023-10-20 RX ADMIN — HYDROCODONE BITARTRATE AND ACETAMINOPHEN 1 TABLET: 10; 325 TABLET ORAL at 14:04

## 2023-10-20 RX ADMIN — HYDROCODONE BITARTRATE AND ACETAMINOPHEN 1 TABLET: 10; 325 TABLET ORAL at 09:43

## 2023-10-20 RX ADMIN — LORAZEPAM 2 MG: 2 TABLET ORAL at 04:32

## 2023-10-20 RX ADMIN — THERA TABS 1 TABLET: TAB at 09:44

## 2023-10-20 RX ADMIN — Medication 100 MG: at 09:44

## 2023-10-20 RX ADMIN — LORAZEPAM 3 MG: 2 TABLET ORAL at 19:46

## 2023-10-20 RX ADMIN — LORAZEPAM 4 MG: 2 INJECTION INTRAMUSCULAR; INTRAVENOUS at 23:43

## 2023-10-20 RX ADMIN — QUETIAPINE FUMARATE 25 MG: 25 TABLET ORAL at 19:46

## 2023-10-20 RX ADMIN — LORAZEPAM 2 MG: 2 TABLET ORAL at 15:38

## 2023-10-20 RX ADMIN — SODIUM CHLORIDE, PRESERVATIVE FREE 10 ML: 5 INJECTION INTRAVENOUS at 19:46

## 2023-10-20 RX ADMIN — ACETAMINOPHEN 650 MG: 325 TABLET ORAL at 04:32

## 2023-10-20 RX ADMIN — HYDROCODONE BITARTRATE AND ACETAMINOPHEN 1 TABLET: 10; 325 TABLET ORAL at 02:45

## 2023-10-20 RX ADMIN — LORAZEPAM 2 MG: 2 TABLET ORAL at 00:50

## 2023-10-20 RX ADMIN — HYDROCODONE BITARTRATE AND ACETAMINOPHEN 1 TABLET: 10; 325 TABLET ORAL at 22:06

## 2023-10-20 RX ADMIN — LEVETIRACETAM 500 MG: 500 TABLET, FILM COATED ORAL at 19:46

## 2023-10-20 RX ADMIN — FOLIC ACID 1 MG: 1 TABLET ORAL at 09:44

## 2023-10-20 RX ADMIN — NICOTINE POLACRILEX 2 MG: 2 LOZENGE ORAL at 17:33

## 2023-10-20 RX ADMIN — HYDROCODONE BITARTRATE AND ACETAMINOPHEN 1 TABLET: 10; 325 TABLET ORAL at 18:41

## 2023-10-20 RX ADMIN — LEVETIRACETAM 500 MG: 500 TABLET, FILM COATED ORAL at 09:44

## 2023-10-20 ASSESSMENT — PAIN SCALES - GENERAL
PAINLEVEL_OUTOF10: 0
PAINLEVEL_OUTOF10: 8
PAINLEVEL_OUTOF10: 0
PAINLEVEL_OUTOF10: 6
PAINLEVEL_OUTOF10: 4
PAINLEVEL_OUTOF10: 8
PAINLEVEL_OUTOF10: 6
PAINLEVEL_OUTOF10: 3
PAINLEVEL_OUTOF10: 0
PAINLEVEL_OUTOF10: 8

## 2023-10-20 ASSESSMENT — PAIN DESCRIPTION - DESCRIPTORS
DESCRIPTORS: ACHING
DESCRIPTORS: ACHING;BURNING
DESCRIPTORS: ACHING

## 2023-10-20 ASSESSMENT — PAIN - FUNCTIONAL ASSESSMENT
PAIN_FUNCTIONAL_ASSESSMENT: ACTIVITIES ARE NOT PREVENTED
PAIN_FUNCTIONAL_ASSESSMENT: PREVENTS OR INTERFERES SOME ACTIVE ACTIVITIES AND ADLS
PAIN_FUNCTIONAL_ASSESSMENT: ACTIVITIES ARE NOT PREVENTED
PAIN_FUNCTIONAL_ASSESSMENT: ACTIVITIES ARE NOT PREVENTED

## 2023-10-20 ASSESSMENT — PAIN DESCRIPTION - LOCATION
LOCATION: HEAD

## 2023-10-20 ASSESSMENT — PAIN DESCRIPTION - ORIENTATION
ORIENTATION: UPPER
ORIENTATION: LEFT
ORIENTATION: ANTERIOR
ORIENTATION: ANTERIOR
ORIENTATION: LEFT
ORIENTATION: LOWER
ORIENTATION: ANTERIOR

## 2023-10-20 ASSESSMENT — PAIN DESCRIPTION - PAIN TYPE: TYPE: SURGICAL PAIN

## 2023-10-20 ASSESSMENT — PAIN DESCRIPTION - FREQUENCY: FREQUENCY: CONTINUOUS

## 2023-10-20 NOTE — CARE COORDINATION
Case Management Assessment  Initial Evaluation    Date/Time of Evaluation: 10/20/2023 2:16 PM  Assessment Completed by: Adela Adams RN, BSN    If patient is discharged prior to next notation, then this note serves as note for discharge by case management. Patient Name: Stephanie Mccord                   YOB: 1975  Diagnosis: Subdural hematoma ([address]) [S06. 5XAA]                   Date / Time: 10/15/2023  4:22 PM    Patient Admission Status: Inpatient   Readmission Risk (Low < 19, Mod (19-27), High > 27): Readmission Risk Score: 14    Current PCP: Jose Fung  PCP verified by CM? Yes    Chart Reviewed: Yes      History Provided by: Patient  Patient Orientation: Alert and Oriented    Patient Cognition: Alert    Hospitalization in the last 30 days (Readmission):  No    If yes, Readmission Assessment in CM Navigator will be completed. Advance Directives:      Code Status: Full Code   Patient's Primary Decision Maker is: Patient Declined (Legal Next of Kin Remains as Decision Maker)      Discharge Planning:    Patient lives with: Alone Type of Home: House  Primary Care Giver: Self  Patient Support Systems include: Parent, Friends/Neighbors   Current Financial resources: Medicaid  Current community resources: None  Current services prior to admission: None            Current DME:              Type of Home Care services:  Nursing Services, PT, OT    ADLS  Prior functional level: Independent in ADLs/IADLs  Current functional level: Independent in ADLs/IADLs    PT AM-PAC:   /24  OT AM-PAC:   /24    Family can provide assistance at DC: Yes  Would you like Case Management to discuss the discharge plan with any other family members/significant others, and if so, who?  No  Plans to Return to Present Housing: Unknown at present  Other Identified Issues/Barriers to RETURNING to current housing: mobility  Potential Assistance needed at discharge: Home Care            Potential DME: none  Patient expects to

## 2023-10-20 NOTE — PROGRESS NOTES
Physical Therapy  Name: Kayla Garcia  MRN:  [id number]  Date of service:  10/20/2023     10/20/23 1430   Restrictions/Precautions   Restrictions/Precautions Fall Risk;Seizure   Required Braces or Orthoses? No   Subjective   Subjective Pt agreed to therapy. Pain Assessment   Pain Assessment 0-10   Pain Level 8   Pain Location Head   Pain Orientation Left   Pain Descriptors Aching   Functional Pain Assessment Prevents or interferes some active activities and ADLs   Pain Type Surgical pain   Pain Frequency Continuous   Non-Pharmaceutical Pain Intervention(s) Ambulation/Increased Activity;Repositioned; Rest   Response to Pain Intervention Patient satisfied   Bed Mobility   Supine to Sit Independent   Sit to Supine Independent   Transfers   Sit to Stand Stand by assistance   Stand to Sit Stand by assistance   Ambulation   Surface Level tile   Device No Device   Assistance Stand by assistance;Contact guard assistance   Quality of Gait no LOB, mild path deviation   Gait Deviations Deviated path   Distance 450'   Patient Goals    Patient Goals  go home   Short Term Goals   Time Frame for Short Term Goals 2 wks   Short Term Goal 1 supine to sit indep   Short Term Goal 2 sit to stand indep   Short Term Goal 3 amb. 200' with RW SBA   Short Term Goal 4 bed to chair SBA   Conditions Requiring Skilled Therapeutic Intervention   Body Structures, Functions, Activity Limitations Requiring Skilled Therapeutic Intervention Decreased functional mobility ; Decreased cognition;Decreased safe awareness;Decreased strength;Decreased balance;Decreased coordination; Increased pain;Decreased posture   Assessment Pt able to amb without AD this date and had no LOB. Tolerated increased distance and pt reports endurance is better this date. Steady overall with occasional mild path deviations. Assisted to bed with all needs in reach.    Activity Tolerance   Activity Tolerance Patient tolerated treatment well   PT Plan of Care   Friday X   Safety

## 2023-10-21 ENCOUNTER — APPOINTMENT (OUTPATIENT)
Dept: CT IMAGING | Age: 48
End: 2023-10-21
Payer: MEDICAID

## 2023-10-21 VITALS
OXYGEN SATURATION: 99 % | SYSTOLIC BLOOD PRESSURE: 123 MMHG | WEIGHT: 165.9 LBS | DIASTOLIC BLOOD PRESSURE: 64 MMHG | HEIGHT: 73 IN | RESPIRATION RATE: 16 BRPM | BODY MASS INDEX: 21.99 KG/M2 | HEART RATE: 77 BPM | TEMPERATURE: 97.7 F

## 2023-10-21 LAB
ANION GAP SERPL CALCULATED.3IONS-SCNC: 11 MMOL/L (ref 7–19)
BASOPHILS # BLD: 0 K/UL (ref 0–0.2)
BASOPHILS NFR BLD: 0.5 % (ref 0–1)
BUN SERPL-MCNC: 11 MG/DL (ref 6–20)
CALCIUM SERPL-MCNC: 8.9 MG/DL (ref 8.6–10)
CHLORIDE SERPL-SCNC: 104 MMOL/L (ref 98–111)
CO2 SERPL-SCNC: 24 MMOL/L (ref 22–29)
CREAT SERPL-MCNC: 0.7 MG/DL (ref 0.5–1.2)
EOSINOPHIL # BLD: 0.1 K/UL (ref 0–0.6)
EOSINOPHIL NFR BLD: 1.6 % (ref 0–5)
ERYTHROCYTE [DISTWIDTH] IN BLOOD BY AUTOMATED COUNT: 12.5 % (ref 11.5–14.5)
GLUCOSE SERPL-MCNC: 105 MG/DL (ref 74–109)
HCT VFR BLD AUTO: 26.2 % (ref 42–52)
HGB BLD-MCNC: 9 G/DL (ref 14–18)
IMM GRANULOCYTES # BLD: 0 K/UL
LYMPHOCYTES # BLD: 1.7 K/UL (ref 1.1–4.5)
LYMPHOCYTES NFR BLD: 27.3 % (ref 20–40)
MAGNESIUM SERPL-MCNC: 2.1 MG/DL (ref 1.6–2.6)
MCH RBC QN AUTO: 34.6 PG (ref 27–31)
MCHC RBC AUTO-ENTMCNC: 34.4 G/DL (ref 33–37)
MCV RBC AUTO: 100.8 FL (ref 80–94)
MONOCYTES # BLD: 0.9 K/UL (ref 0–0.9)
MONOCYTES NFR BLD: 13.7 % (ref 0–10)
NEUTROPHILS # BLD: 3.6 K/UL (ref 1.5–7.5)
NEUTS SEG NFR BLD: 56.7 % (ref 50–65)
PLATELET # BLD AUTO: 162 K/UL (ref 130–400)
PMV BLD AUTO: 10.3 FL (ref 9.4–12.4)
POTASSIUM SERPL-SCNC: 3.8 MMOL/L (ref 3.5–5)
RBC # BLD AUTO: 2.6 M/UL (ref 4.7–6.1)
SODIUM SERPL-SCNC: 139 MMOL/L (ref 136–145)
WBC # BLD AUTO: 6.3 K/UL (ref 4.8–10.8)

## 2023-10-21 PROCEDURE — 85025 COMPLETE CBC W/AUTO DIFF WBC: CPT

## 2023-10-21 PROCEDURE — 83735 ASSAY OF MAGNESIUM: CPT

## 2023-10-21 PROCEDURE — 80048 BASIC METABOLIC PNL TOTAL CA: CPT

## 2023-10-21 PROCEDURE — 36415 COLL VENOUS BLD VENIPUNCTURE: CPT

## 2023-10-21 PROCEDURE — 6370000000 HC RX 637 (ALT 250 FOR IP): Performed by: NEUROLOGICAL SURGERY

## 2023-10-21 PROCEDURE — 70450 CT HEAD/BRAIN W/O DYE: CPT

## 2023-10-21 PROCEDURE — 2580000003 HC RX 258: Performed by: INTERNAL MEDICINE

## 2023-10-21 PROCEDURE — 6360000002 HC RX W HCPCS: Performed by: INTERNAL MEDICINE

## 2023-10-21 PROCEDURE — 6370000000 HC RX 637 (ALT 250 FOR IP): Performed by: INTERNAL MEDICINE

## 2023-10-21 RX ORDER — ZIPRASIDONE MESYLATE 20 MG/ML
20 INJECTION, POWDER, LYOPHILIZED, FOR SOLUTION INTRAMUSCULAR ONCE
Status: DISCONTINUED | OUTPATIENT
Start: 2023-10-21 | End: 2023-10-21 | Stop reason: SDUPTHER

## 2023-10-21 RX ORDER — ZIPRASIDONE HYDROCHLORIDE 20 MG/1
20 CAPSULE ORAL 2 TIMES DAILY WITH MEALS
Status: DISCONTINUED | OUTPATIENT
Start: 2023-10-21 | End: 2023-10-21

## 2023-10-21 RX ORDER — WATER 1000 ML/1000ML
INJECTION, SOLUTION INTRAVENOUS
Status: DISCONTINUED
Start: 2023-10-21 | End: 2023-10-21 | Stop reason: HOSPADM

## 2023-10-21 RX ADMIN — HYDROCODONE BITARTRATE AND ACETAMINOPHEN 1 TABLET: 10; 325 TABLET ORAL at 02:16

## 2023-10-21 RX ADMIN — NICOTINE POLACRILEX 2 MG: 2 LOZENGE ORAL at 02:16

## 2023-10-21 ASSESSMENT — PAIN SCALES - GENERAL
PAINLEVEL_OUTOF10: 6
PAINLEVEL_OUTOF10: 0

## 2023-10-21 ASSESSMENT — PAIN DESCRIPTION - LOCATION: LOCATION: HEAD

## 2023-10-21 ASSESSMENT — PAIN DESCRIPTION - DESCRIPTORS: DESCRIPTORS: ACHING

## 2023-10-21 ASSESSMENT — PAIN - FUNCTIONAL ASSESSMENT: PAIN_FUNCTIONAL_ASSESSMENT: ACTIVITIES ARE NOT PREVENTED

## 2023-10-21 ASSESSMENT — PAIN DESCRIPTION - ORIENTATION: ORIENTATION: ANTERIOR

## 2023-10-21 NOTE — PROGRESS NOTES
Patient became agitated and expressed his will to leave. He was displaying erratic behavior and was not oriented to the reality of his situation. Distraction and redirection was attempted to no avail. Patient left his room and a Code Emilia was called. Dr. Maximiliano Colin and [facility] Supervisor arrived, the situation was explained to them both. (He s/p crani day 6, due for a repeat CT to ensure the hematoma has not shifted with  possibility of surgery today.) Patient was very agitated and called 911. Dr. Maximiliano Colin was able to calm patient who was then agreeable to the CT scan. In CT scan, the patient voiced that he will be leaving after the scan no matter what as he is \"exercising his right as an [place] citizen. \" Dayshift nurse is calling surgeon to read CT scan STAT when it is completed. Patient is on table in CT scan at this time with writer present, [address], and LPN Kristin Matthews. Security is on standby in the hallway to escort patient and staff back to room and ensure the safety of all parties involved.

## 2023-10-21 NOTE — PROGRESS NOTES
Dr. Margarito Armstrong notified by this RN that patient has returned from [address] [facility] and wishes to sign out AMA. Dr. Margarito Armstrong replied via message : \"CT stable. He can sign out AMA if he chooses, or wait for discharge to complete\". Pt chose to sign out AMA now. Papers signed and patient belongings gathered and placed in plastic bag. Pt stated he had all belongings. Escorted to Emergency Room exit via wheelchair with Magy Geller. Pt ambulated independently out exit. No acute distress, denied any dizziness, steady gait, belongings in hand. RN advised patient to call 911 for any s/sx of worsening neuro status including pain, bleeding, vision changes, weakness, vomiting, syncope or any bleeding or drainage from staples. Pt able to teach back RN instructions.

## 2023-10-21 NOTE — DISCHARGE SUMMARY
Hospitalist Discharge Summary  [facility]    Patient: Carlos Peruvian  : 1975  MRN: [id number]  Code Status: Full Code  PCP: Corey Weiss  Attending: Margo Karimi MD  Admission Date: 10/15/2023  Discharge Date: 10/21/2023    Hospital Course:   48-year-old male with alcohol dependence admitted to critical care unit for acute left epidural hematoma. Neurosurgery following  S/p evacuation of epidural hematoma 10/15/2023  Drain since removed  Keppra  CIWA protocol  Remains off Precedex gtt  Daily thiamine/folate/MVI  PT/OT/SLP  SCDs for DVT prophylaxis  Discussed treatment plan with patient/RN/neurosurgery    Patient left AMA on 10/21/2023 while I was off service. Discharge Exam:   Patient left AMA while I was off service    Recent Labs     10/19/23  0401 10/20/23  0244 10/21/23  0202   WBC 6.3 7.2 6.3   HGB 9.4* 8.7* 9.0*   * 142 162     Recent Labs     10/19/23  0401 10/20/23  0244 10/21/23  0202    133* 139   K 3.9 3.8 3.8    102 104   CO2 21* 20* 24   BUN 9 15 11   CREATININE 0.5 0.6 0.7   GLUCOSE 143* 107 105     No results for input(s): \"AST\", \"ALT\", \"ALB\", \"BILITOT\", \"ALKPHOS\" in the last 72 hours. Troponin T: No results for input(s): \"TROPONINI\" in the last 72 hours. Pro-BNP: No results for input(s): \"BNP\" in the last 72 hours. INR:   Recent Labs     10/20/23  1150   INR 1.00     UA:No results for input(s): \"NITRITE\", \"COLORU\", \"PHUR\", \"LABCAST\", \"WBCUA\", \"RBCUA\", \"MUCUS\", \"TRICHOMONAS\", \"YEAST\", \"BACTERIA\", \"CLARITYU\", \"SPECGRAV\", \"LEUKOCYTESUR\", \"UROBILINOGEN\", \"BILIRUBINUR\", \"BLOODU\", \"GLUCOSEU\", \"AMORPHOUS\" in the last 72 hours. A1C: No results for input(s): \"LABA1C\" in the last 72 hours. ABG:No results for input(s): \"PHART\", \"OXI3JKY\", \"PO2ART\", \"QDI1GZU\", \"BEART\", \"HGBAE\", \"W3BWLJUP\", \"CARBOXHGBART\" in the last 72 hours. CT HEAD WO CONTRAST    Result Date: 10/21/2023  1.   Minimal/questionable decrease in the left subdural mixed attenuation

## 2023-10-24 NOTE — PROGRESS NOTES
Physician Progress Note      PATIENT:               Aidan Leon  CSN #:                  [id number]  :                       1975  ADMIT DATE:       10/15/2023 4:22 PM  [address] [facility] DATE:        10/21/2023 8:02 AM  RESPONDING  PROVIDER #:        Gerardo Peralta MD          QUERY TEXT:    Patient admitted with head injury. Noted documentation of intracranial   epidural  hematoma and subdural hemorrhage. If possible, please document in   progress notes and discharge summary if you are treating/evaluating any of the   following: The medical record reflects the following:?  ?? Risk Factors:  Chronic alcoholism, HX of seizures  ? ? Clinical Indicators:  Acute left epidural hematoma with impending   herniation. Chronic alcohol abuse. Found on bedroom floor. ?? Treatment: 1 unit transfusion of platelets and craniotomy. Labs,   Neurosurgery consult. Thank you  Olga Jose RN, BSN, [facility]  [phone]  Options provided:  -- Traumatic intracranial epidural hematoma  -- Nontraumatic intracranial epidural hematoma  -- Other - I will add my own diagnosis  -- Disagree - Not applicable / Not valid  -- Disagree - Clinically unable to determine / Unknown  -- Refer to Clinical Documentation Reviewer    PROVIDER RESPONSE TEXT:    This patient has a traumatic intracranial epidural hematoma.     Query created by: Olga Jose on 10/24/2023 11:04 AM      Electronically signed by:  Gerardo Peralta MD 10/24/2023 11:25 AM

## 2023-11-08 ENCOUNTER — TELEPHONE (OUTPATIENT)
Dept: NEUROSURGERY | Age: 48
End: 2023-11-08

## 2023-11-08 DIAGNOSIS — S06.4X0A INTRACRANIAL EPIDURAL HEMATOMA (HCC): Primary | ICD-10-CM

## 2023-11-08 DIAGNOSIS — S06.5XAA SDH (SUBDURAL HEMATOMA) (HCC): ICD-10-CM

## 2023-11-08 NOTE — TELEPHONE ENCOUNTER
----- Message from Francy Aviles MD sent at 11/8/2023 10:25 AM CST -----  Regarding: Patient needs follow up  This patient left AMA from the hospital after a craniotomy in October. I can't see that he's been scheduled for follow-up or had his staples removed. We need to get him in to the office with a repeat CT scan of the head.

## 2023-11-09 NOTE — TELEPHONE ENCOUNTER
Called and spoke with patient, he states that his staples were removed one week ago. He states that he is doing well and is sleeping/eating well. He VU to come to appt with Dr Ignacio Grace and CT. Called CT and they cannot get him in until 12/5. He VU appt time and date.

## 2023-11-27 ENCOUNTER — TELEPHONE (OUTPATIENT)
Dept: NEUROSURGERY | Age: 48
End: 2023-11-27

## 2023-11-27 NOTE — TELEPHONE ENCOUNTER
Patient elft message requesting an earlier appt. We have an opening tomorrow 11/28 at 8:30 AM. I called pt and left detailed vm with this information and provided our office callback number [phone].

## 2023-11-28 NOTE — TELEPHONE ENCOUNTER
Per Dr Cecilio Ferreira, patient needs head CT before appt. Called to let patient know, he stated that he was in the parking lot but never called to confirm appt as advised. I stated that we will have to put appt back to original day due to CT not having available appts. He stated that he starts new job Monday and it will make him look back to take off the next day. I stated that I am sorry and we can look for another day. He VU to keep appt for 12/5.

## 2023-12-05 ENCOUNTER — HOSPITAL ENCOUNTER (OUTPATIENT)
Dept: CT IMAGING | Age: 48
Discharge: HOME | End: 2023-12-05
Attending: NEUROLOGICAL SURGERY
Payer: MEDICAID

## 2023-12-05 ENCOUNTER — OFFICE VISIT (OUTPATIENT)
Dept: NEUROSURGERY | Age: 48
End: 2023-12-05

## 2023-12-05 VITALS
HEART RATE: 80 BPM | SYSTOLIC BLOOD PRESSURE: 142 MMHG | BODY MASS INDEX: 22 KG/M2 | DIASTOLIC BLOOD PRESSURE: 80 MMHG | WEIGHT: 166.01 LBS | HEIGHT: 73 IN

## 2023-12-05 DIAGNOSIS — S06.4X0A INTRACRANIAL EPIDURAL HEMATOMA (HCC): Primary | ICD-10-CM

## 2023-12-05 DIAGNOSIS — Z98.890 S/P CRANIOTOMY: ICD-10-CM

## 2023-12-05 DIAGNOSIS — S06.5XAA SDH (SUBDURAL HEMATOMA) (HCC): ICD-10-CM

## 2023-12-05 PROCEDURE — 70450 CT HEAD/BRAIN W/O DYE: CPT

## 2023-12-05 PROCEDURE — 99024 POSTOP FOLLOW-UP VISIT: CPT | Performed by: NEUROLOGICAL SURGERY

## 2023-12-05 ASSESSMENT — ENCOUNTER SYMPTOMS
GASTROINTESTINAL NEGATIVE: 1
RESPIRATORY NEGATIVE: 1
BACK PAIN: 1
EYES NEGATIVE: 1

## 2023-12-05 NOTE — PROGRESS NOTES
Review of Systems   Constitutional: Negative. HENT: Negative. Eyes: Negative. Respiratory: Negative. Cardiovascular: Negative. Gastrointestinal: Negative. Genitourinary: Negative. Musculoskeletal:  Positive for back pain, joint pain, myalgias and neck pain. Skin: Negative. Neurological:  Positive for dizziness, tremors, weakness and headaches. Endo/Heme/Allergies: Negative. Psychiatric/Behavioral: Negative.

## 2024-01-23 ENCOUNTER — TELEPHONE (OUTPATIENT)
Dept: NEUROSURGERY | Age: 49
End: 2024-01-23

## 2024-01-23 DIAGNOSIS — S06.4X0A INTRACRANIAL EPIDURAL HEMATOMA (HCC): Primary | ICD-10-CM

## 2024-01-23 NOTE — TELEPHONE ENCOUNTER
LVM for patient to call back with r/s appt time and date. CT was not able to accommodate with CT head time and date. CT was not ordered at last visit. Ordered and scheduled.

## 2024-03-13 ENCOUNTER — OFFICE VISIT (OUTPATIENT)
Dept: NEUROSURGERY | Age: 49
End: 2024-03-13
Payer: MEDICAID

## 2024-03-13 ENCOUNTER — HOSPITAL ENCOUNTER (OUTPATIENT)
Dept: CT IMAGING | Age: 49
Discharge: HOME | End: 2024-03-13
Attending: NEUROLOGICAL SURGERY
Payer: MEDICAID

## 2024-03-13 VITALS
WEIGHT: 166.01 LBS | BODY MASS INDEX: 22 KG/M2 | HEIGHT: 73 IN | DIASTOLIC BLOOD PRESSURE: 82 MMHG | SYSTOLIC BLOOD PRESSURE: 138 MMHG | HEART RATE: 77 BPM

## 2024-03-13 DIAGNOSIS — Z98.890 S/P CRANIOTOMY: ICD-10-CM

## 2024-03-13 DIAGNOSIS — S06.4X0A INTRACRANIAL EPIDURAL HEMATOMA (HCC): Primary | ICD-10-CM

## 2024-03-13 DIAGNOSIS — S06.4X0A INTRACRANIAL EPIDURAL HEMATOMA (HCC): ICD-10-CM

## 2024-03-13 PROCEDURE — G8484 FLU IMMUNIZE NO ADMIN: HCPCS | Performed by: NEUROLOGICAL SURGERY

## 2024-03-13 PROCEDURE — 70450 CT HEAD/BRAIN W/O DYE: CPT

## 2024-03-13 PROCEDURE — G8420 CALC BMI NORM PARAMETERS: HCPCS | Performed by: NEUROLOGICAL SURGERY

## 2024-03-13 PROCEDURE — G8427 DOCREV CUR MEDS BY ELIG CLIN: HCPCS | Performed by: NEUROLOGICAL SURGERY

## 2024-03-13 PROCEDURE — 4004F PT TOBACCO SCREEN RCVD TLK: CPT | Performed by: NEUROLOGICAL SURGERY

## 2024-03-13 PROCEDURE — 99214 OFFICE O/P EST MOD 30 MIN: CPT | Performed by: NEUROLOGICAL SURGERY

## 2024-03-13 ASSESSMENT — ENCOUNTER SYMPTOMS
RESPIRATORY NEGATIVE: 1
EYES NEGATIVE: 1
GASTROINTESTINAL NEGATIVE: 1

## 2024-03-13 NOTE — PROGRESS NOTES
Review of Systems   Constitutional: Negative.    HENT: Negative.     Eyes: Negative.    Respiratory: Negative.     Cardiovascular: Negative.    Gastrointestinal: Negative.    Genitourinary: Negative.    Musculoskeletal: Negative.    Skin: Negative.    Neurological: Negative.    Endo/Heme/Allergies: Negative.    Psychiatric/Behavioral: Negative.

## 2024-03-13 NOTE — PROGRESS NOTES
NEUROSURGERY POSTOPERATIVE FOLLOW UP NOTE      Chief Complaint:   Chief Complaint   Patient presents with    Follow-up     Patient states that he is feeling a lot better since last visit. Decreases headaches and no dizziness.     Results     CT 3/13/24         Date of Surgery: 10/15/2023    Procedure Performed:  Left craniotomy for evacuation of epidural hematoma      Interval Update:  Patient returns for follow-up.  He is doing remarkably well.  His headaches have nearly resolved and he has regained his previous level of function.      HPI:     The patient is a 47 y.o. male who presented to the [facility] ED after being found down by his family today.  He apparently has a history of heavy alcohol use and was attempting to \"self detox\" according to ED staff.  His mother and father are at bedside and do endorse a heavy alcohol use history, as well as possible seizures.  He was apparently found on the ground with his head under a desk and bleeding from his nose.  He was noted to be quite altered on arrival to the ED and a STAT CT of the head was obtained revealing a large apparent left hemispheric epidural hematoma for which I have been asked to provide neurosurgical consultation.  He is currently lethargic but will open his eyes to voice and is oriented to self.  He demonstrates a dense right hemiparesis on exam but will follow commands briskly on the left.         ROS    Constitutional: Negative.    HENT: Negative.     Eyes: Negative.    Respiratory: Negative.     Cardiovascular: Negative.    Gastrointestinal: Negative.    Genitourinary: Negative.    Musculoskeletal: Negative.    Skin: Negative.    Neurological: Negative.    Endo/Heme/Allergies: Negative.    Psychiatric/Behavioral: Negative.      Review of systems was obtained by the medical assistant and reviewed by myself      Objective:    /82   Pulse 77   Ht 1.854 m (6' 0.99\")   Wt 75.3 kg (166 lb 0.1 oz)   BMI 21.91 kg/m²         Physical

## 2024-05-31 RX ORDER — ATORVASTATIN CALCIUM 20 MG/1
20 TABLET, FILM COATED ORAL DAILY
COMMUNITY

## 2024-05-31 RX ORDER — ESCITALOPRAM OXALATE 20 MG/1
20 TABLET ORAL DAILY
COMMUNITY

## (undated) DEVICE — GOWN, ORBIS, XLARGE, STERILE: Brand: MEDLINE

## (undated) DEVICE — DRAPE,T,CRANIOTOMY,STERILE: Brand: MEDLINE

## (undated) DEVICE — TOOL MR8-F2/7TA23 MR8 F2/7CM TAPER 2.3MM: Brand: MIDAS REX MR8

## (undated) DEVICE — SHEET,DRAPE,53X77,STERILE: Brand: MEDLINE

## (undated) DEVICE — Device

## (undated) DEVICE — DRAPE SLUSH DISC W44XL66IN ST FOR RND BSIN HUSH SLUSH SYS

## (undated) DEVICE — NEPTUNE E-SEP SMOKE EVACUATION PENCIL, COATED, 70MM BLADE, ROCKER SWITCH: Brand: NEPTUNE E-SEP

## (undated) DEVICE — OCCLUSIVE GAUZE STRIP,3% BISMUTH TRIBROMOPHENATE IN PETROLATUM BLEND: Brand: XEROFORM

## (undated) DEVICE — CODMAN® DISPOSABLE PERFORATOR 14MM: Brand: CODMAN®

## (undated) DEVICE — FORCEP BPLR IRIS

## (undated) DEVICE — HOOK RETRCT DIA12.5MM E THRD DISP DURAHK

## (undated) DEVICE — AGENT NEXT GEN VIRTUAL KT SURGIFLO

## (undated) DEVICE — CLIP CART AD GRN ACRA TYP SGL JAW SERR EDGE CLMP FORC HVY

## (undated) DEVICE — SUTURE VCRL SZ 2-0 L18IN ABSRB VLT L26MM CT-2 1/2 CIR J726D

## (undated) DEVICE — TOOL MR8-9MH30 MR8 9CM MATCH 3MM: Brand: MIDAS REX MR8

## (undated) DEVICE — CAUTERY TIP POLISHER: Brand: DEVON

## (undated) DEVICE — UNDERGLOVE SURG SZ 8 FNGR THK0.21MIL GRN LTX BEAD CUF

## (undated) DEVICE — TOOL MR8-7TA11 MR8 7CM TAPER 1.1MM: Brand: MIDAS REX MR8

## (undated) DEVICE — BANDAGE,GAUZE,4.5"X4.1YD,STERILE,LF: Brand: MEDLINE

## (undated) DEVICE — RUBBERBAND FASTENING W1/8XL3IN 2 PER PK

## (undated) DEVICE — GLOVE SURG SZ 8 CRM LTX FREE POLYISOPRENE POLYMER BEAD ANTI

## (undated) DEVICE — GAUZE,SPONGE,FLUFF,6"X6.75",STRL,10/TRAY: Brand: MEDLINE

## (undated) DEVICE — KIT EVAC 0.13IN RECT TB DIA10FR 400CC PVC 3 SPR Y CONN DRN

## (undated) DEVICE — AGENT HEMSTAT W2XL4IN OXIDIZED REGENERATED CELOS ABSRB SFT

## (undated) DEVICE — SPONGE LAP W18XL18IN WHT COT 4 PLY FLD STRUNG RADPQ DISP ST 2 PER PACK

## (undated) DEVICE — SUTURE NRLN SZ 4-0 L18IN NONABSORBABLE BLK L13MM TF 1/2 CIR C584D

## (undated) DEVICE — TOWEL,OR,DSP,ST,BLUE,DLX,4/PK,20PK/CS: Brand: MEDLINE

## (undated) DEVICE — SURGICAL PROCEDURE PACK CRANIOTOMY CUST CDS LOURDES HOSP

## (undated) DEVICE — DRESSING PETROLATUM 2X2IN NON ADH ABSORB

## (undated) DEVICE — BATTERY DRVR W/ SELF DRL TAP FOR THINFLAP PWR DRVR